# Patient Record
Sex: FEMALE | Race: WHITE | NOT HISPANIC OR LATINO | Employment: FULL TIME | URBAN - METROPOLITAN AREA
[De-identification: names, ages, dates, MRNs, and addresses within clinical notes are randomized per-mention and may not be internally consistent; named-entity substitution may affect disease eponyms.]

---

## 2018-10-17 ENCOUNTER — TRANSCRIBE ORDERS (OUTPATIENT)
Dept: ADMINISTRATIVE | Facility: HOSPITAL | Age: 53
End: 2018-10-17

## 2018-10-17 DIAGNOSIS — I10 HYPERTENSION, UNSPECIFIED TYPE: Primary | ICD-10-CM

## 2018-10-18 ENCOUNTER — LAB (OUTPATIENT)
Dept: LAB | Facility: HOSPITAL | Age: 53
End: 2018-10-18

## 2018-10-18 ENCOUNTER — TRANSCRIBE ORDERS (OUTPATIENT)
Dept: ADMINISTRATIVE | Facility: HOSPITAL | Age: 53
End: 2018-10-18

## 2018-10-18 DIAGNOSIS — R07.9 CHEST PAIN, UNSPECIFIED TYPE: ICD-10-CM

## 2018-10-18 DIAGNOSIS — R53.83 TIREDNESS: ICD-10-CM

## 2018-10-18 DIAGNOSIS — I15.9 SECONDARY HYPERTENSION: ICD-10-CM

## 2018-10-18 DIAGNOSIS — R53.83 TIREDNESS: Primary | ICD-10-CM

## 2018-10-18 LAB
ALBUMIN SERPL-MCNC: 4.9 G/DL (ref 3.5–5.2)
ALBUMIN/GLOB SERPL: 1.9 G/DL
ALP SERPL-CCNC: 64 U/L (ref 39–117)
ALT SERPL W P-5'-P-CCNC: 59 U/L (ref 1–33)
ANION GAP SERPL CALCULATED.3IONS-SCNC: 10.8 MMOL/L
AST SERPL-CCNC: 40 U/L (ref 1–32)
BILIRUB SERPL-MCNC: 0.6 MG/DL (ref 0.1–1.2)
BUN BLD-MCNC: 16 MG/DL (ref 6–20)
BUN/CREAT SERPL: 22.5 (ref 7–25)
CALCIUM SPEC-SCNC: 9.9 MG/DL (ref 8.6–10.5)
CHLORIDE SERPL-SCNC: 104 MMOL/L (ref 98–107)
CHOLEST SERPL-MCNC: 204 MG/DL (ref 0–200)
CO2 SERPL-SCNC: 28.2 MMOL/L (ref 22–29)
CREAT BLD-MCNC: 0.71 MG/DL (ref 0.57–1)
GFR SERPL CREATININE-BSD FRML MDRD: 85 ML/MIN/1.73
GLOBULIN UR ELPH-MCNC: 2.6 GM/DL
GLUCOSE BLD-MCNC: 125 MG/DL (ref 65–99)
HDLC SERPL-MCNC: 44 MG/DL (ref 40–60)
LDLC SERPL CALC-MCNC: 142 MG/DL (ref 0–100)
LDLC/HDLC SERPL: 3.23 {RATIO}
POTASSIUM BLD-SCNC: 4.5 MMOL/L (ref 3.5–5.2)
PROT SERPL-MCNC: 7.5 G/DL (ref 6–8.5)
SODIUM BLD-SCNC: 143 MMOL/L (ref 136–145)
T4 SERPL-MCNC: 6.85 MCG/DL (ref 4.5–11.7)
TRIGL SERPL-MCNC: 89 MG/DL (ref 0–150)
TSH SERPL DL<=0.05 MIU/L-ACNC: 2.88 MIU/ML (ref 0.27–4.2)
VLDLC SERPL-MCNC: 17.8 MG/DL (ref 5–40)

## 2018-10-18 PROCEDURE — 36415 COLL VENOUS BLD VENIPUNCTURE: CPT

## 2018-10-18 PROCEDURE — 80053 COMPREHEN METABOLIC PANEL: CPT

## 2018-10-18 PROCEDURE — 80061 LIPID PANEL: CPT

## 2018-10-18 PROCEDURE — 84443 ASSAY THYROID STIM HORMONE: CPT

## 2018-10-18 PROCEDURE — 84436 ASSAY OF TOTAL THYROXINE: CPT

## 2018-10-22 ENCOUNTER — HOSPITAL ENCOUNTER (OUTPATIENT)
Dept: CARDIOLOGY | Facility: HOSPITAL | Age: 53
Discharge: HOME | End: 2018-10-22
Admitting: INTERNAL MEDICINE

## 2018-10-22 VITALS — DIASTOLIC BLOOD PRESSURE: 85 MMHG | SYSTOLIC BLOOD PRESSURE: 138 MMHG

## 2018-10-22 DIAGNOSIS — I10 HYPERTENSION, UNSPECIFIED TYPE: ICD-10-CM

## 2018-10-22 LAB
BH CV ECHO MEAS - ACS: 2 CM
BH CV ECHO MEAS - AO MEAN PG (FULL): 1 MMHG
BH CV ECHO MEAS - AO MEAN PG: 4 MMHG
BH CV ECHO MEAS - AO ROOT AREA (BSA CORRECTED): 1.5
BH CV ECHO MEAS - AO ROOT AREA: 6.6 CM^2
BH CV ECHO MEAS - AO ROOT DIAM: 2.9 CM
BH CV ECHO MEAS - AO V2 MAX: 130 CM/SEC
BH CV ECHO MEAS - AO V2 MEAN: 88 CM/SEC
BH CV ECHO MEAS - AO V2 VTI: 27.8 CM
BH CV ECHO MEAS - ASC AORTA: 2.9 CM
BH CV ECHO MEAS - AVA(I,A): 3.1 CM^2
BH CV ECHO MEAS - AVA(I,D): 3.1 CM^2
BH CV ECHO MEAS - BSA(HAYCOCK): 2 M^2
BH CV ECHO MEAS - BSA: 1.9 M^2
BH CV ECHO MEAS - BZI_BMI: 30 KILOGRAMS/M^2
BH CV ECHO MEAS - BZI_METRIC_HEIGHT: 165.1 CM
BH CV ECHO MEAS - BZI_METRIC_WEIGHT: 81.6 KG
BH CV ECHO MEAS - EDV(CUBED): 74.1 ML
BH CV ECHO MEAS - EDV(MOD-SP2): 81 ML
BH CV ECHO MEAS - EDV(MOD-SP4): 92 ML
BH CV ECHO MEAS - EDV(TEICH): 78.6 ML
BH CV ECHO MEAS - EF(CUBED): 55.8 %
BH CV ECHO MEAS - EF(MOD-BP): 56 %
BH CV ECHO MEAS - EF(MOD-SP2): 54.3 %
BH CV ECHO MEAS - EF(MOD-SP4): 57.6 %
BH CV ECHO MEAS - EF(TEICH): 47.9 %
BH CV ECHO MEAS - ESV(CUBED): 32.8 ML
BH CV ECHO MEAS - ESV(MOD-SP2): 37 ML
BH CV ECHO MEAS - ESV(MOD-SP4): 39 ML
BH CV ECHO MEAS - ESV(TEICH): 41 ML
BH CV ECHO MEAS - FS: 23.8 %
BH CV ECHO MEAS - IVS/LVPW: 1
BH CV ECHO MEAS - IVSD: 1.1 CM
BH CV ECHO MEAS - LAT PEAK E' VEL: 10 CM/SEC
BH CV ECHO MEAS - LV DIASTOLIC VOL/BSA (35-75): 48.6 ML/M^2
BH CV ECHO MEAS - LV MASS(C)D: 157.1 GRAMS
BH CV ECHO MEAS - LV MASS(C)DI: 83 GRAMS/M^2
BH CV ECHO MEAS - LV MEAN PG: 3 MMHG
BH CV ECHO MEAS - LV SYSTOLIC VOL/BSA (12-30): 20.6 ML/M^2
BH CV ECHO MEAS - LV V1 MAX: 119 CM/SEC
BH CV ECHO MEAS - LV V1 MEAN: 74.3 CM/SEC
BH CV ECHO MEAS - LV V1 VTI: 27.6 CM
BH CV ECHO MEAS - LVIDD: 4.2 CM
BH CV ECHO MEAS - LVIDS: 3.2 CM
BH CV ECHO MEAS - LVLD AP2: 7.3 CM
BH CV ECHO MEAS - LVLD AP4: 7.6 CM
BH CV ECHO MEAS - LVLS AP2: 6.3 CM
BH CV ECHO MEAS - LVLS AP4: 6.7 CM
BH CV ECHO MEAS - LVOT AREA (M): 3.1 CM^2
BH CV ECHO MEAS - LVOT AREA: 3.1 CM^2
BH CV ECHO MEAS - LVOT DIAM: 2 CM
BH CV ECHO MEAS - LVPWD: 1.1 CM
BH CV ECHO MEAS - MED PEAK E' VEL: 9 CM/SEC
BH CV ECHO MEAS - MV A DUR: 0.14 SEC
BH CV ECHO MEAS - MV A MAX VEL: 105 CM/SEC
BH CV ECHO MEAS - MV DEC SLOPE: 314 CM/SEC^2
BH CV ECHO MEAS - MV DEC TIME: 0.22 SEC
BH CV ECHO MEAS - MV E MAX VEL: 95.3 CM/SEC
BH CV ECHO MEAS - MV E/A: 0.91
BH CV ECHO MEAS - MV MEAN PG: 1 MMHG
BH CV ECHO MEAS - MV P1/2T MAX VEL: 81.4 CM/SEC
BH CV ECHO MEAS - MV P1/2T: 75.9 MSEC
BH CV ECHO MEAS - MV V2 MEAN: 52.3 CM/SEC
BH CV ECHO MEAS - MV V2 VTI: 29.8 CM
BH CV ECHO MEAS - MVA P1/2T LCG: 2.7 CM^2
BH CV ECHO MEAS - MVA(P1/2T): 2.9 CM^2
BH CV ECHO MEAS - MVA(VTI): 2.9 CM^2
BH CV ECHO MEAS - PA ACC SLOPE: 0 CM/SEC^2
BH CV ECHO MEAS - PA ACC TIME: 0.16 SEC
BH CV ECHO MEAS - PA MAX PG (FULL): 1 MMHG
BH CV ECHO MEAS - PA MAX PG: 3.1 MMHG
BH CV ECHO MEAS - PA PR(ACCEL): 6.1 MMHG
BH CV ECHO MEAS - PA V2 MAX: 88.4 CM/SEC
BH CV ECHO MEAS - PULM A REVS DUR: 0.11 SEC
BH CV ECHO MEAS - PULM A REVS VEL: 22.1 CM/SEC
BH CV ECHO MEAS - PULM DIAS VEL: 33.8 CM/SEC
BH CV ECHO MEAS - PULM S/D: 1.6
BH CV ECHO MEAS - PULM SYS VEL: 54.1 CM/SEC
BH CV ECHO MEAS - PVA(V,A): 2.8 CM^2
BH CV ECHO MEAS - PVA(V,D): 2.8 CM^2
BH CV ECHO MEAS - QP/QS: 0.74
BH CV ECHO MEAS - RAP SYSTOLE: 3 MMHG
BH CV ECHO MEAS - RV MAX PG: 2.1 MMHG
BH CV ECHO MEAS - RV MEAN PG: 1 MMHG
BH CV ECHO MEAS - RV V1 MAX: 72.2 CM/SEC
BH CV ECHO MEAS - RV V1 MEAN: 47.1 CM/SEC
BH CV ECHO MEAS - RV V1 VTI: 18.6 CM
BH CV ECHO MEAS - RVOT AREA: 3.5 CM^2
BH CV ECHO MEAS - RVOT DIAM: 2.1 CM
BH CV ECHO MEAS - RVSP: 28 MMHG
BH CV ECHO MEAS - SI(AO): 97.1 ML/M^2
BH CV ECHO MEAS - SI(CUBED): 21.8 ML/M^2
BH CV ECHO MEAS - SI(LVOT): 45.8 ML/M^2
BH CV ECHO MEAS - SI(MOD-SP2): 23.3 ML/M^2
BH CV ECHO MEAS - SI(MOD-SP4): 28 ML/M^2
BH CV ECHO MEAS - SI(TEICH): 19.9 ML/M^2
BH CV ECHO MEAS - SUP REN AO DIAM: 1.8 CM
BH CV ECHO MEAS - SV(AO): 183.6 ML
BH CV ECHO MEAS - SV(CUBED): 41.3 ML
BH CV ECHO MEAS - SV(LVOT): 86.7 ML
BH CV ECHO MEAS - SV(MOD-SP2): 44 ML
BH CV ECHO MEAS - SV(MOD-SP4): 53 ML
BH CV ECHO MEAS - SV(RVOT): 64.4 ML
BH CV ECHO MEAS - SV(TEICH): 37.6 ML
BH CV ECHO MEAS - TAPSE (>1.6): 2 CM2
BH CV ECHO MEAS - TR MAX VEL: 251 CM/SEC
BH CV ECHO MEASUREMENTS AVERAGE E/E' RATIO: 10.03
BH CV VAS BP RIGHT ARM: NORMAL MMHG
BH CV XLRA - RV BASE: 2.5 CM
BH CV XLRA - TDI S': 14 CM/SEC
LEFT ATRIUM VOLUME INDEX: 23 ML/M2
LV EF 2D ECHO EST: 56 %
MAXIMAL PREDICTED HEART RATE: 165 BPM
STRESS TARGET HR: 140 BPM

## 2018-10-22 PROCEDURE — 93306 TTE W/DOPPLER COMPLETE: CPT | Performed by: INTERNAL MEDICINE

## 2018-10-22 PROCEDURE — 93306 TTE W/DOPPLER COMPLETE: CPT

## 2018-10-23 ENCOUNTER — HOSPITAL ENCOUNTER (OUTPATIENT)
Dept: GENERAL RADIOLOGY | Facility: HOSPITAL | Age: 53
Discharge: HOME | End: 2018-10-23
Admitting: INTERNAL MEDICINE

## 2018-10-23 DIAGNOSIS — F19.90: ICD-10-CM

## 2018-10-23 PROCEDURE — 71046 X-RAY EXAM CHEST 2 VIEWS: CPT

## 2019-03-26 ENCOUNTER — TELEMEDICINE (OUTPATIENT)
Dept: FAMILY MEDICINE CLINIC | Facility: TELEHEALTH | Age: 54
End: 2019-03-26

## 2019-03-26 DIAGNOSIS — J20.9 ACUTE BRONCHITIS, UNSPECIFIED ORGANISM: Primary | ICD-10-CM

## 2019-03-26 PROCEDURE — VIDEOVISIT: Performed by: NURSE PRACTITIONER

## 2019-03-26 RX ORDER — METHYLPREDNISOLONE 4 MG/1
TABLET ORAL
Qty: 21 TABLET | Refills: 0 | Status: SHIPPED | OUTPATIENT
Start: 2019-03-26 | End: 2020-03-24 | Stop reason: SDUPTHER

## 2019-03-26 RX ORDER — ALBUTEROL SULFATE 90 UG/1
2 AEROSOL, METERED RESPIRATORY (INHALATION) EVERY 4 HOURS PRN
Qty: 1 INHALER | Refills: 0 | Status: SHIPPED | OUTPATIENT
Start: 2019-03-26 | End: 2020-03-24

## 2019-03-26 NOTE — PATIENT INSTRUCTIONS
Comfort Measures--  1. Increase water intake; decaffeinated beverages are best  2. May take mucinex or OTC cold medication if it helps relieve symptoms  3. Tylenol/ibuprofen for fever/pain (make sure if taking cold medication you are not doubling up your dose of either of these)  4. May take sudafed for congestion; DO NOT take if you have high blood pressure.  5. Flonase nasal spray, 1 spray to each side of nose twice a day          Acute Bronchitis, Adult    Acute bronchitis is when air tubes (bronchi) in the lungs suddenly get swollen. The condition can make it hard to breathe. It can also cause these symptoms:  · A cough.  · Coughing up clear, yellow, or green mucus.  · Wheezing.  · Chest congestion.  · Shortness of breath.  · A fever.  · Body aches.  · Chills.  · A sore throat.     Follow these instructions at home:  Medicines  · Take over-the-counter and prescription medicines only as told by your doctor.  · If you were prescribed an antibiotic medicine, take it as told by your doctor. Do not stop taking the antibiotic even if you start to feel better.  General instructions  · Rest.  · Drink enough fluids to keep your pee (urine) clear or pale yellow.  · Avoid smoking and secondhand smoke. If you smoke and you need help quitting, ask your doctor. Quitting will help your lungs heal faster.  · Use an inhaler, cool mist vaporizer, or humidifier as told by your doctor.  · Keep all follow-up visits as told by your doctor. This is important.  How is this prevented?    To lower your risk of getting this condition again:  · Wash your hands often with soap and water. If you cannot use soap and water, use hand .  · Avoid contact with people who have cold symptoms.  · Try not to touch your hands to your mouth, nose, or eyes.  · Make sure to get the flu shot every year.     Contact a doctor if:  · Your symptoms do not get better in 2 weeks.  Get help right away if:  · You cough up blood.  · You have chest  pain.  · You have very bad shortness of breath.  · You become dehydrated.  · You faint (pass out) or keep feeling like you are going to pass out.  · You keep throwing up (vomiting).  · You have a very bad headache.  · Your fever or chills gets worse.      This information is not intended to replace advice given to you by your health care provider. Make sure you discuss any questions you have with your health care provider.    Document Released: 06/05/2009 Document Revised: 07/26/2017 Document Reviewed: 06/07/2017  [employer] Interactive Patient Education © 2018 [employer]     Cough, Adult    A cough helps to clear your throat and lungs. A cough may last only 2-3 weeks (acute), or it may last longer than 8 weeks (chronic). Many different things can cause a cough. A cough may be a sign of an illness or another medical condition.  Follow these instructions at home:  · Pay attention to any changes in your cough.  · Take medicines only as told by your doctor.  ? If you were prescribed an antibiotic medicine, take it as told by your doctor. Do not stop taking it even if you start to feel better.  ? Talk with your doctor before you try using a cough medicine.  · Drink enough fluid to keep your pee (urine) clear or pale yellow.  · If the air is dry, use a cold steam vaporizer or humidifier in your home.  · Stay away from things that make you cough at work or at home.  · If your cough is worse at night, try using extra pillows to raise your head up higher while you sleep.  · Do not smoke, and try not to be around smoke. If you need help quitting, ask your doctor.  · Do not have caffeine.  · Do not drink alcohol.  · Rest as needed.  Contact a doctor if:  · You have new problems (symptoms).  · You cough up yellow fluid (pus).  · Your cough does not get better after 2-3 weeks, or your cough gets worse.  · Medicine does not help your cough and you are not sleeping well.  · You have pain that gets worse or pain that is not  helped with medicine.  · You have a fever.  · You are losing weight and you do not know why.  · You have night sweats.  Get help right away if:  · You cough up blood.  · You have trouble breathing.  · Your heartbeat is very fast.      This information is not intended to replace advice given to you by your health care provider. Make sure you discuss any questions you have with your health care provider.    Document Released: 08/30/2012 Document Revised: 05/25/2017 Document Reviewed: 02/24/2016  [employer] Interactive Patient Education © 2018 [employer]

## 2019-03-26 NOTE — PROGRESS NOTES
CHIEF COMPLAINT  No chief complaint on file.      HPI  Yokasta Benitez is a 53 y.o. female  presents with complaint of 4-5 day history of chest congestion, sore throat, prod cough with yellowish sputum production, fever with chills/sweaty, mild wheezing, ear pain, nasal congestion with runny nose which has improved, decreased appetite, fatigue    She has history of bronchitis which tends to come on quickly; denies hx of pneumonia    Review of Systems   Denies h/a, congestion, sinus pressure/pain, sore throat, cough,  SOA, body aches,  n/v/d, abdominal pain    Pertinent ROS noted in HPI    Past Medical History:   Diagnosis Date   • Hypertension 10/2018       Family History   Problem Relation Age of Onset   • Cancer Father          within 6 weeks of diagnosis       Social History     Socioeconomic History   • Marital status:      Spouse name: Not on file   • Number of children: Not on file   • Years of education: Not on file   • Highest education level: Not on file   Tobacco Use   • Smoking status: Former Smoker     Packs/day: 1.00     Years: 15.00     Pack years: 15.00     Start date: 1985     Last attempt to quit: 2002     Years since quittin.1   Substance and Sexual Activity   • Alcohol use: No   • Drug use: No   • Sexual activity: No         There were no vitals taken for this visit.    PHYSICAL EXAM  Physical Exam   Constitutional: She is oriented to person, place, and time. She appears well-developed and well-nourished. She is cooperative.   HENT:   Head: Normocephalic and atraumatic.   Neurological: She is alert and oriented to person, place, and time.         Problem List Items Addressed This Visit     None      Visit Diagnoses     Acute bronchitis, unspecified organism    -  Primary    Relevant Medications    albuterol sulfate  (90 Base) MCG/ACT inhaler    methylPREDNISolone (MEDROL, TERE,) 4 MG tablet        Discussed home care instructions which will be available in patient's My  Chart account.    Bronchitis is a viral illness, therefore an antibiotic would not help her symptoms, she has been instructed to take a tapering dose of Medrol to help decrease inflammation in her lungs; also she will start an Albuterol inhaler every 4-6 hours as needed for wheezing; increase fluid intake to maintain hydration, rest as needed; comfort measures are included in home care instructions      FOLLOW-UP  2-3 days with Dr. Paulino Garcia if no improvement or worsening of symptoms    Patient verbalizes understanding of medication dosage, comfort measures, instructions for treatment and follow-up.    Digna Rascon, ECTOR  03/26/2019  12:38 PM    Virtual visit via My Chart video conference:  Due to the inability to perform a vital signs or a physical exam, treatment is guided by information provided by the patient during the visit; all patients are instructed to follow-up with their primary care provider if their symptoms worsen or the treatment provided does not resolve their illness; if the illness and/or symptoms warrant the need for an in-person visit for further evaluation, the patient will be notified of the provider's concern.

## 2019-03-28 ENCOUNTER — TELEPHONE (OUTPATIENT)
Dept: FAMILY MEDICINE CLINIC | Facility: TELEHEALTH | Age: 54
End: 2019-03-28

## 2019-10-16 ENCOUNTER — TRANSCRIBE ORDERS (OUTPATIENT)
Dept: ADMINISTRATIVE | Facility: HOSPITAL | Age: 54
End: 2019-10-16

## 2019-10-16 DIAGNOSIS — Z12.39 SCREENING BREAST EXAMINATION: Primary | ICD-10-CM

## 2019-11-11 ENCOUNTER — HOSPITAL ENCOUNTER (OUTPATIENT)
Dept: MAMMOGRAPHY | Facility: HOSPITAL | Age: 54
Discharge: HOME | End: 2019-11-11
Admitting: FAMILY MEDICINE

## 2019-11-11 DIAGNOSIS — Z12.39 SCREENING BREAST EXAMINATION: ICD-10-CM

## 2019-11-11 PROCEDURE — 77063 BREAST TOMOSYNTHESIS BI: CPT

## 2019-11-11 PROCEDURE — 77067 SCR MAMMO BI INCL CAD: CPT

## 2019-11-12 ENCOUNTER — APPOINTMENT (OUTPATIENT)
Dept: MAMMOGRAPHY | Facility: HOSPITAL | Age: 54
End: 2019-11-12

## 2020-02-04 ENCOUNTER — PREP FOR SURGERY (OUTPATIENT)
Dept: OTHER | Facility: HOSPITAL | Age: 55
End: 2020-02-04

## 2020-02-04 DIAGNOSIS — Z12.11 ENCOUNTER FOR SCREENING COLONOSCOPY: Primary | ICD-10-CM

## 2020-02-10 PROBLEM — Z12.11 ENCOUNTER FOR SCREENING COLONOSCOPY: Status: ACTIVE | Noted: 2020-02-10

## 2020-03-24 ENCOUNTER — E-VISIT (OUTPATIENT)
Dept: FAMILY MEDICINE CLINIC | Facility: TELEHEALTH | Age: 55
End: 2020-03-24

## 2020-03-24 DIAGNOSIS — J40 BRONCHITIS: Primary | ICD-10-CM

## 2020-03-24 PROBLEM — I10 HYPERTENSION: Status: ACTIVE | Noted: 2018-10-08

## 2020-03-24 PROCEDURE — 99421 OL DIG E/M SVC 5-10 MIN: CPT | Performed by: NURSE PRACTITIONER

## 2020-03-24 RX ORDER — CEFDINIR 300 MG/1
300 CAPSULE ORAL 2 TIMES DAILY
Qty: 14 CAPSULE | Refills: 0 | Status: SHIPPED | OUTPATIENT
Start: 2020-03-24 | End: 2022-01-03

## 2020-03-24 RX ORDER — METHYLPREDNISOLONE 4 MG/1
TABLET ORAL
Qty: 21 TABLET | Refills: 0 | Status: SHIPPED | OUTPATIENT
Start: 2020-03-24 | End: 2022-01-03

## 2020-03-24 RX ORDER — BENZONATATE 200 MG/1
200 CAPSULE ORAL 3 TIMES DAILY PRN
Qty: 30 CAPSULE | Refills: 0 | Status: SHIPPED | OUTPATIENT
Start: 2020-03-24 | End: 2022-01-03

## 2020-03-24 RX ORDER — ALBUTEROL SULFATE 90 UG/1
1 AEROSOL, METERED RESPIRATORY (INHALATION) EVERY 6 HOURS PRN
Qty: 1 INHALER | Refills: 0 | Status: SHIPPED | OUTPATIENT
Start: 2020-03-24 | End: 2022-01-03

## 2020-03-24 NOTE — PROGRESS NOTES
COVID-19 screening--negative    Yokasta Benitez    1965  [id number]    I have reviewed the e-Visit questionnaire and patient's answers and have spoken with the patient on the phone, my assessment and plan are as follows:    HPI  Patient reports runny nose, nasal congestion, post nasal drainage, headache, left ear pain and dry cough.  She reports the head symptoms started about 5 days ago and the cough started yesterday.  No fever.  She reports her chest hurts with hard coughing and at times she feels like she has had some wheezing.  She reports similar symptoms with bronchitis in the past.      Review of Systems - Negative except as noted in history      Diagnoses and all orders for this visit:    Bronchitis    Other orders  -     cefdinir (OMNICEF) 300 MG capsule; Take 1 capsule by mouth 2 (Two) Times a Day.  -     albuterol sulfate  (90 Base) MCG/ACT inhaler; Inhale 1 puff Every 6 (Six) Hours As Needed for Wheezing or Shortness of Air.  -     methylPREDNISolone (MEDROL, TERE,) 4 MG tablet; Take as directed on package instructions.  -     benzonatate (TESSALON) 200 MG capsule; Take 1 capsule by mouth 3 (Three) Times a Day As Needed for Cough.    Medication as prescribed.  Coricidin HBP over the counter for symptom relief.  May also use saline nasal spray for congestion.  Follow up in 1 week if no improvement.      Any medications prescribed have been sent electronically to   [facility] [address] [place], [place] [address] [address] [address] [facility] [address] - [phone]  - [phone] [address]  [facility] [address]  Phone: [phone] Fax: [phone]        ECTOR Evans  03/24/20  7:44 AM

## 2020-03-24 NOTE — PATIENT INSTRUCTIONS
Medication as prescribed.  Coricidin HBP over the counter for symptom relief.  May also use saline nasal spray for congestion.  Follow up in 1 week if no improvement.

## 2020-03-30 ENCOUNTER — OFFICE VISIT CONVERTED (OUTPATIENT)
Dept: OTHER | Facility: HOSPITAL | Age: 55
End: 2020-03-30
Attending: PHYSICIAN ASSISTANT

## 2020-03-30 ENCOUNTER — E-VISIT (OUTPATIENT)
Dept: FAMILY MEDICINE CLINIC | Facility: TELEHEALTH | Age: 55
End: 2020-03-30

## 2020-03-30 ENCOUNTER — HOSPITAL ENCOUNTER (OUTPATIENT)
Dept: OTHER | Facility: HOSPITAL | Age: 55
Discharge: HOME | End: 2020-03-30
Attending: PHYSICIAN ASSISTANT

## 2020-03-30 NOTE — PROGRESS NOTES
I counseled the patient to follow up with UC.    Per algorithm, recommended she seek evaluation at designated testing site.

## 2020-04-02 ENCOUNTER — OFFICE VISIT CONVERTED (OUTPATIENT)
Dept: OTHER | Facility: HOSPITAL | Age: 55
End: 2020-04-02
Attending: PHYSICIAN ASSISTANT

## 2020-04-03 ENCOUNTER — CONVERSION ENCOUNTER (OUTPATIENT)
Dept: OTHER | Facility: HOSPITAL | Age: 55
End: 2020-04-03

## 2020-04-03 ENCOUNTER — TELEMEDICINE CONVERTED (OUTPATIENT)
Dept: OTHER | Facility: HOSPITAL | Age: 55
End: 2020-04-03
Attending: PHYSICIAN ASSISTANT

## 2020-06-02 ENCOUNTER — TELEPHONE (OUTPATIENT)
Dept: SURGERY | Facility: CLINIC | Age: 55
End: 2020-06-02

## 2020-06-02 NOTE — TELEPHONE ENCOUNTER
We contacted pt on several occasion to re-schedule c-cope w/no return call we will take pt off list & she can call to sched @ her convenience

## 2020-08-05 ENCOUNTER — HOSPITAL ENCOUNTER (OUTPATIENT)
Dept: GENERAL RADIOLOGY | Facility: HOSPITAL | Age: 55
Discharge: HOME | End: 2020-08-05
Admitting: FAMILY MEDICINE

## 2020-08-05 DIAGNOSIS — J18.9 PNEUMONIA DUE TO INFECTIOUS ORGANISM, UNSPECIFIED LATERALITY, UNSPECIFIED PART OF LUNG: ICD-10-CM

## 2020-08-05 PROCEDURE — 71046 X-RAY EXAM CHEST 2 VIEWS: CPT

## 2021-05-10 NOTE — H&P
History and Physical      Patient Name: Yokasta Benitez   Patient ID: [id number]   Sex: Female   YOB: 1965    Referring Provider: Meche BARNEY    Visit Date: March 30, 2020    Provider: Anusha Padilla PA-C   Location[facility]   Location Address: [address] [place] [address]  [id number]   Location Phone: [phone]          Chief Complaint  · Evaluation for Respiratory Virus      History Of Present Illness  Yokasta Benietz is a 54 year old /White female who presents today to the clinic for evaluation of a respiratory virus.   Date of Onset: 03/23/2020   What Symptoms: fatigue , fever, nausea, and shortness of breath   Quality of Symptoms: Patient was treated by [facility] last week and treated for bronchitis. She had albuterol inhaler, cough drops, prednisone steroid pack, cefdinir. She is not getting better. She feels fatigued. She is short of breath with minimal activity. She has ongoing nausea. She has had low grade fevers daily. Never above 100.4 But between 99 and 100   Anything make it worse or better: Nothin has helped. Even the medications above.   Severity of Symptoms: severe   Any known Exposure to COVID-19: works in [employer] at [employer].       Allergy List  azithromycin       Allergies Reconciled  Review of Systems  · Constitutional  o Admits  o : fatigue, fever  o Denies  o : weight gain, weight loss  · Respiratory  o Denies  o : cough, asthma  · Gastrointestinal  o Admits  o : nausea  o Denies  o : vomiting, diarrhea, constipation, abdominal pain  · Integument  o Denies  o : rash  · Neurologic  o Denies  o : headache      Vitals  Date Time BP Position Site L\R Cuff Size HR RR TEMP (F) WT  HT  BMI kg/m2 BSA m2 O2 Sat HC       03/30/2020 10:10 AM      108 - R  99.6     96 %          Physical Examination  · Constitutional  o Appearance  o : no acute distress, well-nourished  · Head and Face  o Head  o :   § Inspection  § : atraumatic,  normocephalic  · Eyes  o Eyes  o : extraocular movements intact, no scleral icterus, no conjunctival injection  · Ears, Nose, Mouth and Throat  o Ears  o :   § External Ears  § : normal  § Otoscopic Examination  § : normal tympanic membrane exam  o Nose  o :   § Intranasal Exam  § : sinuses nontender to palpation  o Oral Cavity  o :   § Oral Mucosa  § : moist mucous membranes  o Throat  o :   § Oropharynx  § : no inflammation or lesions present, tonsillar exudates present   · Respiratory  o Respiratory Effort  o : breathing comfortably, symmetric chest rise  o Auscultation of Lungs  o : wheezing present-left-lower lobe   · Cardiovascular  o Heart  o :   § Auscultation of Heart  § : regular rate and rhythm, no murmurs, rubs, or gallops  o Peripheral Vascular System  o :   § Extremities  § : no edema  · Lymphatic  o Neck  o : no lymphadenopathy present  o Supraclavicular Nodes  o : no supraclavicular nodes  · Skin and Subcutaneous Tissue  o General Inspection  o : normal inspection  · Neurologic  o Mental Status Examination  o :   § Orientation  § : grossly oriented to person, place and time  o Gait and Station  o :   § Gait Screening  § : normal gait  · Psychiatric  o General  o : normal mood and affect     Eyes are very glassy in appearance.  Continue to water      She coughs with deep inspiration.               Results  · In-Office Procedures  o Lab procedure  § IOP - Influenza A/B Test (32372)   § Influenza A: Negative   § Influenza B: Negative   § Internal Control Verified?: Yes   § Rapid group A Streptococcus screen (45005)   § Beta Strep Gp A Culture: Negative   § Internal Control Verified?: Yes       Assessment  · Shortness of breath     786.05/R06.02  · Tonsillar exudate     474.8/J35.8    Problems Reconciled  Plan  · Orders  o Chest (AP PA and Lateral) 2 views X-Ray [facility] (27712) - 786.05/R06.02 - 03/30/2020  · Medications  o Augmentin 875-125 mg oral tablet   SIG: take 1 tablet by oral route every 12 hours  for 10 days   DISP: (20) tablets with 0 refills  Prescribed on 03/30/2020     o Diflucan 150 mg oral tablet   SIG: take 1 tablet (150 mg) by oral route once   DISP: (1) tablet with 0 refills  Prescribed on 03/30/2020     o Medications have been Reconciled  o Transition of Care or Provider Policy  · Instructions  o Plan of Care:   o Patient instructed to seek medical attention urgently for new or worsening symptoms.  o Patient was educated on their diagnosis, treatment, and medications today.   o Recommend self monitoring. Instructions given.   o Self-monitoring for 14 days. Instructions given.  o Stay home until without fever for 72 hours without using fever-reducing medications and other symptoms are gone.  o Recommends over the counter medications for symptom management.  o Follow-up with PCP in 2-3 days.  o Patient was given the no test Covid paperwork. She is not to return to work until 3 days without fever without treatment. Augmentin ordered due to tonsillar exudate.   · Disposition  o Call or Return if symptoms worsen or persist.            Electronically Signed by: Anusha Padilla PA-C -Author on March 30, 2020 10:40:23 AM

## 2021-05-12 NOTE — PROGRESS NOTES
Quick Note      Patient Name: Yokasta Benitez   Patient ID: [id number]   Sex: Female   YOB: 1965    Primary Care Provider: Paulino Garcia MD   Referring Provider: Meche BARNEY    Visit Date: April 3, 2020    Provider: Anusha Padilla PA-C   Location: [facility]   Location Address: [address] [place] [address]  [id number]   Location Phone: [phone]          History Of Present Illness  TELEHEALTH VISIT  Chief Complaint: Follow-up after telehealth visit on 4/2/2020 where she was complaining of worsening in her persistent nausea and vomiting. Zofran was called in as well as Phenergan suppositories   Yokasta Benitez is a 54 year old /White female who is presenting for evaluation via telehealth visit. Verbal consent obtained before beginning visit.   The following staff were present during this visit: Front office staff of [facility]   Past Medical History/Overview of Patient Symptoms  Date of Onset: 3/23/2020   What Symptoms: Patient called the office yesterday and rescheduled telehealth visit. As a telehealth visit patient stated that she was with persistent nausea and vomiting Zofran was not working. She had been to the emergency room on 3/31/2020 after being seen in the office. They sent her home after giving her IV fluids. She states that her shortness of breath has not worsened and it has not necessarily improved. Since being evaluated yesterday via telehealth patient was given Phenergan suppositories. She states last night was the first night she was able to get some sleep without feeling like she was constantly nauseous and worried about vomiting. She did sound slightly better today as far as her hoarseness on exam. She is still coughing, she still feels fatigued. She is working on increasing her fluid intake now that her nausea started to improve with the Phenergan. She still has a horrific headache. She states OTC medications are not helping. She  is still able to take oral antibiotic.   Quality of Symptoms: severe   Anything make it worse or better: Phenergan has helped with nausea and vomiting   Severity of Symptoms: severe as far as nausea   Any known Exposure to Person with COVID-19: no known exposure   Provider spent 9 minutes minutes with the patient during telehealth visit.       Vitals  Date Time BP Position Site L\R Cuff Size HR RR TEMP (F) WT  HT  BMI kg/m2 BSA m2 O2 Sat HC       04/03/2020 10:29 AM        100.4               Physical Examination  · Constitutional  o Appearance  o : well-nourished, well developed, alert, in no acute distress  · Neurologic  o Mental Status Examination  o :   § Orientation  § : grossly oriented to person, place and time  § Attention  § : attention normal, concentration abilities normal  § Fund of Knowledge  § : fund of knowledge within normal limits, patient aware of current events  · Psychiatric  o Judgement and Insight  o : judgment and insight intact, judgement for everyday activities and social situations within normal limits, insight intact  o Mood and Affect  o : mood normal, affect appropriate          Assessment  · Cough     786.2/R05  · Nausea     787.02/R11.0  · Vomiting     787.03/R11.10      Plan  · Orders  o Physican Telephone evaluation, 5-10 min (73740) - 787.02/R11.0, 787.03/R11.10, 786.2/R05 - 04/03/2020  · Medications  o Medications have been Reconciled  o Transition of Care or Provider Policy  · Instructions  o Plan Of Care:   o Patient instructed to seek medical attention urgently for new or worsening symptoms.  o Patient was educated/instructed on their diagnosis, treatment and medications today.  o Recommend self monitoring. Instructions given.  o Recommends over the counter medications for symptom management.   o Follow-up with PCP in 2-3 days.  o she Will return for annual clinic evaluation on Monday of next week. We will reevaluate as far as physical exam and vital signs. If she is still with  worsening in her nausea and vomiting we can potentially look at IV fluid hydration. If she gets worse over the weekend she is to go to the ER for further evaluation. She can continue taking Zofran and Phenergan as needed. She needs to complete antibiotic course as well  o Electronically Identified Patient Education Materials Provided Electronically            Electronically Signed by: Anusha Padilla PA-C -Author on April 3, 2020 10:29:58 AM

## 2021-05-12 NOTE — PROGRESS NOTES
Quick Note      Patient Name: Yokasta Benitez   Patient ID: [id number]   Sex: Female   YOB: 1965    Primary Care Provider: Paulino Garcia MD   Referring Provider: Meche BARNEY    Visit Date: April 2, 2020    Provider: Anusha Padilla PA-C   Location: [facility]   Location Address: [address] [place] [address]  [id number]   Location Phone: [phone]          History Of Present Illness  Yokasta Benitez is a 54 year old /White female who was contacted today telehealth follow-up visit after evaluation and the respiratory viral evaluation center   Date of Onset: 03/23/2020   What Symptoms: fatigue , fever, nausea, and shortness of breath   Quality of Symptoms: Patient was originally treated by [facility] last week and treated for bronchitis. She had albuterol inhaler, cough drops, prednisone steroid pack, cefdinir. She was not getting better. I did an xray which showed concern for pneumonia. She was sent home with Zofran and Augmentin. After visit she was not improving as far as her nausea and she went to the ER on 3/31/20. Patient was given IV fluids 1 liter of normal saline. She was given Zofran. Repeat x ray was done showing worsening pneumonia. Her O2 saturation was 99% on room air. Patient was discharged home from the ER. She was contacted by our office today for follow up. She states she is not any better. Zofran is not helping. She has not been able to eat and has lost 20 pounds. She is persistently nauseated and has been vomiting multiple times a day. She tries to sip Sprite or water but sometiems it comes right back up. She complains of headache. She does not know if this is because of dehydration or vomiting. Course is just another symptom. Sometimes the medicine that she takes stays down. Sometimes they come right back. Is still shortness of breath but it has not gotten any worse.   Anything make it worse or better: Zofran has not helped with  nausea. She does not feel any better on antibiotic   Severity of Symptoms: severe   Any known Exposure to COVID-19: works in [employer] at [employer].       Vitals     telehealth visit via phone.  No vitals obtained       Physical Examination  · Neurologic  o Mental Status Examination  o :   § Orientation  § : grossly oriented to person, place and time  · Psychiatric  o General  o : normal mood and affect          Assessment  · Headache     784.0/R51  · Nausea     787.02/R11.0  · Shortness of breath     786.05/R06.02  · Vomiting     787.03/R11.10  · Tonsillar exudate     474.8/J35.8  · Pneumonia     486/J18.9      Plan  · Medications  o Medications have been Reconciled  o Transition of Care or Provider Policy  · Instructions  o Plan of Care: Phenergan was called in to pharmacy by VICENTA in office. Patient will try and see if that helps her nausea. If it does not, she is to return to ER for further evaluation and IV fluid hydration. Patient will be contacted by our office again tomorrow for further evaluation. This will be by telehealth. Time on phone with patient today was 5-10 minutes. Patient's PCP was contacted but is on spring break this week and no one is in office.  · Disposition  o Call or Return if symptoms worsen or persist.            Electronically Signed by: Anusha Padilla PA-C -Author on April 2, 2020 09:02:00 AM

## 2021-05-15 VITALS — OXYGEN SATURATION: 96 % | HEART RATE: 108 BPM | TEMPERATURE: 99.6 F

## 2021-05-15 VITALS — TEMPERATURE: 100.4 F

## 2021-08-05 ENCOUNTER — E-VISIT (OUTPATIENT)
Dept: FAMILY MEDICINE CLINIC | Facility: TELEHEALTH | Age: 56
End: 2021-08-05

## 2022-01-02 PROBLEM — Z12.11 ENCOUNTER FOR SCREENING COLONOSCOPY: Status: RESOLVED | Noted: 2020-02-10 | Resolved: 2022-01-02

## 2022-01-02 PROBLEM — Z00.00 WELL ADULT EXAM: Status: ACTIVE | Noted: 2022-01-02

## 2022-01-02 PROBLEM — I10 PRIMARY HYPERTENSION: Status: RESOLVED | Noted: 2018-10-08 | Resolved: 2022-01-02

## 2022-01-02 NOTE — PROGRESS NOTES
"Chief Complaint  Annual Exam (New patient appointment.)    Subjective      Yokasta Benitez presents to [facility]    History of Present Illness  Patient 56-year-old female with prior treatment for hypertension off meds for the past year, Covid pneumonia in 3/2020 not requiring hospitalization, previously followed by Dr. Garcia, coming in 1/22 as a new patient appointment.    Review of Systems   Constitutional: Negative for appetite change, fatigue and fever.   HENT: Negative for congestion and ear pain.    Eyes: Negative for blurred vision.   Respiratory: Negative for cough, chest tightness, shortness of breath and wheezing.    Cardiovascular: Negative for chest pain, palpitations and leg swelling.   Gastrointestinal: Negative for abdominal pain.   Genitourinary: Negative for difficulty urinating, dysuria and hematuria.   Musculoskeletal: Negative for arthralgias and gait problem.   Skin: Negative for skin lesions.   Neurological: Negative for syncope, memory problem and confusion.   Psychiatric/Behavioral: Negative for self-injury and depressed mood.       Objective   Vital Signs:   BP (!) 184/93   Pulse 90   Temp 98.2 °F (36.8 °C)   Ht 165.1 cm (65\")   Wt 81.1 kg (178 lb 12.8 oz)   SpO2 97%   BMI 29.75 kg/m²           Physical Exam  Vitals and nursing note reviewed.   Constitutional:       General: She is not in acute distress.     Appearance: Normal appearance. She is not toxic-appearing.   HENT:      Head: Atraumatic.      Right Ear: External ear normal.      Left Ear: External ear normal.      Nose: Nose normal.      Mouth/Throat:      Mouth: Mucous membranes are moist.   Eyes:      General:         Right eye: No discharge.         Left eye: No discharge.      Extraocular Movements: Extraocular movements intact.      Pupils: Pupils are equal, round, and reactive to light.   Neck:      Comments: No bruit.  Cardiovascular:      Rate and Rhythm: Normal rate and " regular rhythm.      Pulses: Normal pulses.      Heart sounds: Normal heart sounds. No murmur heard.  No gallop.       Comments: Heart tones normal, no ectopy, no S3.  Pulmonary:      Effort: Pulmonary effort is normal. No respiratory distress.      Breath sounds: No wheezing, rhonchi or rales.      Comments: Lung fields clear bilaterally.  Abdominal:      General: There is no distension.      Palpations: Abdomen is soft. There is no mass.      Tenderness: There is no abdominal tenderness. There is no guarding.   Musculoskeletal:         General: No swelling or tenderness.      Cervical back: No tenderness.      Right lower leg: No edema.      Left lower leg: No edema.      Comments: No edema, distal pulses intact.   Skin:     General: Skin is warm and dry.      Findings: No rash.   Neurological:      General: No focal deficit present.      Mental Status: She is alert and oriented to person, place, and time. Mental status is at baseline.      Motor: No weakness.      Gait: Gait normal.   Psychiatric:         Mood and Affect: Mood normal.         Thought Content: Thought content normal.          Result Review   The following data was reviewed by: Tree Seymour MD on 01/03/2022:  [x] Laboratory  [] Microbiology  [] Radiology  [] EKG/telemetry  [] Cardiology/Vascular  [] Pathology  [x] Old records             Assessment and Plan   Diagnoses and all orders for this visit:    1. Well adult exam (Primary)  Overview:  Preventive measures: were reviewed with the patient at this office visit.  They included but were not limited to discussions in regards to vaccines outstanding, auto safety with seat belts and other assistive devices, fall prevention, and routine screening studies.    Exercise: Patient goes to the gym several times a week on average.  Comprehensive labs: All ordered before return to office in 3 months.    Covid vaccine: J&J vaccine x1 in 8/21, discussed with patient she should seriously consider getting the 3  dose treatment of either Pfizer or Moderna as of  office visit.  Flu shot: Completed for 2021 season.    MM19 appears to be last---> discussed with patient this is overdue as of  office visit, she said that she would consider it later in the year.  COLON: Patient is aware as of  that she is 8 years overdue on the colonoscopy.  She denies any blood in her stool other symptoms, discussed with screening studies are recommended regardless.    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 Rodriguez.    Orders:  -     CBC & Differential; Future  -     Comprehensive Metabolic Panel; Future  -     TSH+Free T4; Future  -     Vitamin D 1,25 Dihydroxy; Future  -     Urinalysis With Culture If Indicated -; Future    2. Mixed hyperlipidemia  Assessment & Plan:  LDL was elevated at 142 in , discussed with patient this needs to be repeated and likely treated as of .    Orders:  -     Lipid Panel; Future  -     CK; Future    3. Hyperglycemia  Assessment & Plan:  There was at least one mildly elevated blood sugar, will get A1c with her labs here shortly.    Orders:  -     Hemoglobin A1c; Future    4. Primary hypertension  Overview:  Patient had echocardiogram 10/18 with no significant findings.  EF was normal, there were no valvular abnormalities.    Assessment & Plan:  Patient has previously been on losartan 50 and metoprolol 100.  Patient reports she lost over 30 pounds and weaned herself off of these medications in 2021.  Blood pressure was documented at 164/94 in urgent care and obviously is elevated as of her  office visit.  I confirmed the results.  Discussed with patient she needs to start monitoring her blood pressure several times a week and to bring those results to her follow-up office visit along with her home blood pressure monitor.          Follow Up   Return in about 3 months (around 4/3/2022).  Patient was given  instructions and counseling regarding her condition or for health maintenance advice. Please see specific information pulled into the AVS if appropriate.

## 2022-01-03 ENCOUNTER — OFFICE VISIT (OUTPATIENT)
Dept: INTERNAL MEDICINE | Facility: CLINIC | Age: 57
End: 2022-01-03

## 2022-01-03 VITALS
WEIGHT: 178.8 LBS | BODY MASS INDEX: 29.79 KG/M2 | HEART RATE: 90 BPM | OXYGEN SATURATION: 97 % | HEIGHT: 65 IN | TEMPERATURE: 98.2 F | SYSTOLIC BLOOD PRESSURE: 184 MMHG | DIASTOLIC BLOOD PRESSURE: 93 MMHG

## 2022-01-03 DIAGNOSIS — Z00.00 WELL ADULT EXAM: Primary | ICD-10-CM

## 2022-01-03 DIAGNOSIS — I10 PRIMARY HYPERTENSION: ICD-10-CM

## 2022-01-03 DIAGNOSIS — R73.9 HYPERGLYCEMIA: ICD-10-CM

## 2022-01-03 DIAGNOSIS — E78.2 MIXED HYPERLIPIDEMIA: ICD-10-CM

## 2022-01-03 PROCEDURE — 99386 PREV VISIT NEW AGE 40-64: CPT | Performed by: INTERNAL MEDICINE

## 2022-01-03 NOTE — ASSESSMENT & PLAN NOTE
LDL was elevated at 142 in 2018, discussed with patient this needs to be repeated and likely treated as of 1/22.

## 2022-01-03 NOTE — ASSESSMENT & PLAN NOTE
Patient has previously been on losartan 50 and metoprolol 100.  Patient reports she lost over 30 pounds and weaned herself off of these medications in January 2021.  Blood pressure was documented at 164/94 in urgent care and obviously is elevated as of her 1/22 office visit.  I confirmed the results.  Discussed with patient she needs to start monitoring her blood pressure several times a week and to bring those results to her follow-up office visit along with her home blood pressure monitor.

## 2022-01-05 ENCOUNTER — PATIENT ROUNDING (BHMG ONLY) (OUTPATIENT)
Dept: INTERNAL MEDICINE | Facility: CLINIC | Age: 57
End: 2022-01-05

## 2022-01-05 NOTE — PROGRESS NOTES
January 5, 2022    Hello, may I speak with Yokasta Benitez?    My name is Jeremiah Louise     I am  with [facility]  [facility]  [address] [id number].    Before we get started may I verify your date of birth? 1965    I am calling to officially welcome you to our practice and ask about your recent visit. Is this a good time to talk? yes    Tell me about your visit with us. What things went well?  everything went really well, it was a good visit and there were no issues.       We're always looking for ways to make our patients' experiences even better. Do you have recommendations on ways we may improve? My  was seen and he had to leave without being seen due to his work and he waited over an hour for his visit    Overall were you satisfied with your first visit to our practice? yes       I appreciate you taking the time to speak with me today. Is there anything else I can do for you? no      Thank you, and have a great day.

## 2022-11-14 ENCOUNTER — TELEMEDICINE (OUTPATIENT)
Dept: FAMILY MEDICINE CLINIC | Facility: TELEHEALTH | Age: 57
End: 2022-11-14

## 2022-11-14 DIAGNOSIS — Z20.822 ENCOUNTER BY TELEHEALTH FOR SUSPECTED COVID-19: Primary | ICD-10-CM

## 2022-11-14 DIAGNOSIS — J06.9 UPPER RESPIRATORY TRACT INFECTION, UNSPECIFIED TYPE: ICD-10-CM

## 2022-11-14 PROCEDURE — BHEMPVIDEOVISIT: Performed by: NURSE PRACTITIONER

## 2022-11-14 PROCEDURE — U0004 COV-19 TEST NON-CDC HGH THRU: HCPCS | Performed by: NURSE PRACTITIONER

## 2022-11-14 NOTE — PATIENT INSTRUCTIONS
10 Things You Can Do to Manage Your COVID-19 Symptoms at Home  If you have possible or confirmed COVID-19  Stay home except to get medical care.  Monitor your symptoms carefully. If your symptoms get worse, call your healthcare provider immediately.  Get rest and stay hydrated.  If you have a medical appointment, call the healthcare provider ahead of time and tell them that you have or may have COVID-19.  For medical emergencies, call 911 and notify the dispatch personnel that you have or may have COVID-19.  Cover your cough and sneezes with a tissue or use the inside of your elbow.  Wash your hands often with soap and water for at least 20 seconds or clean your hands with an alcohol-based hand  that contains at least 60% alcohol.  As much as possible, stay in a specific room and away from other people in your home. Also, you should use a separate bathroom, if available. If you need to be around other people in or outside of the home, wear a mask.  Avoid sharing personal items with other people in your household, like dishes, towels, and bedding.  Clean all surfaces that are touched often, like counters, tabletops, and doorknobs. Use household cleaning sprays or wipes according to the label instructions.  cdc.gov/coronavirus  07/16/2021  This information is not intended to replace advice given to you by your health care provider. Make sure you discuss any questions you have with your health care provider.  Document Revised: 09/09/2022 Document Reviewed: 09/09/2022  [employer] Patient Education © 2022 [employer]

## 2022-11-14 NOTE — PROGRESS NOTES
CHIEF COMPLAINT  Chief Complaint   Patient presents with   • URI   • Cough   • Sinus Problem   • Nasal Congestion   • Fever     Low grade intermittent         HPI  Yokasta Benitez is a 57 y.o. female  presents with complaint of 4 day h/o sinus pressure, nasal congestion, cough, chest congestion, low grade fever and mild nausea. She  is self medicating with Dayquil. She states the cough is causing soreness and sleep disruption. She reports no wheezing or shortness of breath. She is a [employer] employee and needs a PCR test.     Review of Systems   Constitutional: Positive for activity change, appetite change and fever. Negative for chills and fatigue (low grade).   HENT: Positive for congestion, postnasal drip, rhinorrhea, sinus pressure, sinus pain and sore throat.    Respiratory: Positive for cough. Negative for shortness of breath, wheezing and stridor.    Cardiovascular: Negative.    Gastrointestinal: Negative.    Musculoskeletal: Negative.    Neurological: Positive for headaches.   Hematological: Negative.    Psychiatric/Behavioral: Negative.        Past Medical History:   Diagnosis Date   • Hypertension 10/2018       Family History   Problem Relation Age of Onset   • Cancer Father          within 6 weeks of diagnosis       Social History     Socioeconomic History   • Marital status:    Tobacco Use   • Smoking status: Former     Packs/day: 1.00     Years: 15.00     Pack years: 15.00     Types: Cigarettes     Start date: 1985     Quit date: 2002     Years since quittin.7   • Smokeless tobacco: Never   Vaping Use   • Vaping Use: Never used   Substance and Sexual Activity   • Alcohol use: No   • Drug use: No   • Sexual activity: Never         There were no vitals taken for this visit.    PHYSICAL EXAM  Physical Exam   Constitutional: She is oriented to person, place, and time. She appears well-developed and well-nourished. She does not have a sickly appearance. She does not appear ill. No  distress.   HENT:   Head: Normocephalic and atraumatic.   Mouth/Throat: Mouth/Lips are normal.  Pulmonary/Chest: Effort normal.  No respiratory distress. She no audible wheeze...  Neurological: She is alert and oriented to person, place, and time.   Psychiatric: She has a normal mood and affect.   Vitals reviewed.      Results for orders placed or performed in visit on 10/27/21   Mumps Antibody, IgG    Specimen: Blood   Result Value Ref Range    Mumps IgG 213.0 Immune >10.9 AU/mL   Varicella Zoster Antibody, IgG    Specimen: Blood   Result Value Ref Range    Varicella IgG 483 Immune >165 index       Diagnoses and all orders for this visit:    1. Encounter by telehealth for suspected COVID-19 (Primary)  -     COVID-19,APTIMA PANTHER(SUSANA), ANDRE/ EMILIANA, NP/OP SWAB IN UTM/VTM/S[facility] TRANSPORT MEDIA,24 HR TAT - Swab, Nasopharynx; Future  -     QUESTIONNAIRE SERIES    2. Upper respiratory tract infection, unspecified type    continue OTC medications and increased decaffeinated fluids.   Quarantine until test results available.   Follow up with [facility] for worsening s/s.    The use of a video visit has been reviewed with the patient and verbal informd consent has een obtained. Myself and Yokasta Benitez participated in this visit. The patient is located in [address] [place] [address]. I am located in [place], [place]. Mychart and Zoom were utilized. I spent 15  minutes in the patient's chart for this visit.           Renata Contreras, APRN  11/14/2022  09:09 EST

## 2022-11-28 ENCOUNTER — HOSPITAL ENCOUNTER (OUTPATIENT)
Dept: GENERAL RADIOLOGY | Facility: HOSPITAL | Age: 57
Discharge: HOME | End: 2022-11-28
Admitting: INTERNAL MEDICINE

## 2022-11-28 DIAGNOSIS — R07.81 RIB PAIN ON RIGHT SIDE: ICD-10-CM

## 2022-11-28 PROCEDURE — 71101 X-RAY EXAM UNILAT RIBS/CHEST: CPT

## 2022-12-01 ENCOUNTER — APPOINTMENT (OUTPATIENT)
Dept: ULTRASOUND IMAGING | Facility: HOSPITAL | Age: 57
End: 2022-12-01

## 2022-12-01 ENCOUNTER — TRANSCRIBE ORDERS (OUTPATIENT)
Dept: ADMINISTRATIVE | Facility: HOSPITAL | Age: 57
End: 2022-12-01

## 2022-12-01 DIAGNOSIS — R10.9 ABDOMINAL PAIN, UNSPECIFIED ABDOMINAL LOCATION: Primary | ICD-10-CM

## 2022-12-02 ENCOUNTER — HOSPITAL ENCOUNTER (OUTPATIENT)
Dept: ULTRASOUND IMAGING | Facility: HOSPITAL | Age: 57
Discharge: HOME | End: 2022-12-02
Admitting: INTERNAL MEDICINE

## 2022-12-02 ENCOUNTER — APPOINTMENT (OUTPATIENT)
Dept: ULTRASOUND IMAGING | Facility: HOSPITAL | Age: 57
End: 2022-12-02

## 2022-12-02 DIAGNOSIS — R10.9 ABDOMINAL PAIN, UNSPECIFIED ABDOMINAL LOCATION: ICD-10-CM

## 2022-12-02 PROCEDURE — 76705 ECHO EXAM OF ABDOMEN: CPT

## 2023-10-09 PROBLEM — Z12.11 ENCOUNTER FOR SCREENING COLONOSCOPY: Status: ACTIVE | Noted: 2020-02-04

## 2024-08-27 ENCOUNTER — OFFICE VISIT (OUTPATIENT)
Dept: FAMILY MEDICINE CLINIC | Facility: CLINIC | Age: 59
End: 2024-08-27
Payer: COMMERCIAL

## 2024-08-27 VITALS
BODY MASS INDEX: 35.16 KG/M2 | WEIGHT: 211 LBS | HEIGHT: 65 IN | HEART RATE: 65 BPM | DIASTOLIC BLOOD PRESSURE: 70 MMHG | SYSTOLIC BLOOD PRESSURE: 146 MMHG | OXYGEN SATURATION: 98 %

## 2024-08-27 DIAGNOSIS — Z00.00 ENCOUNTER FOR MEDICAL EXAMINATION TO ESTABLISH CARE: Primary | ICD-10-CM

## 2024-08-27 DIAGNOSIS — I10 PRIMARY HYPERTENSION: ICD-10-CM

## 2024-08-27 DIAGNOSIS — Z12.31 SCREENING MAMMOGRAM, ENCOUNTER FOR: ICD-10-CM

## 2024-08-27 DIAGNOSIS — Z12.11 ENCOUNTER FOR SCREENING COLONOSCOPY: ICD-10-CM

## 2024-08-27 DIAGNOSIS — I15.9 SECONDARY HYPERTENSION: ICD-10-CM

## 2024-08-27 DIAGNOSIS — Z13.220 SCREENING CHOLESTEROL LEVEL: ICD-10-CM

## 2024-08-27 DIAGNOSIS — E66.9 OBESITY (BMI 30-39.9): ICD-10-CM

## 2024-08-27 DIAGNOSIS — Z11.59 NEED FOR HEPATITIS C SCREENING TEST: ICD-10-CM

## 2024-08-27 DIAGNOSIS — R06.81 APNEA: ICD-10-CM

## 2024-08-27 PROCEDURE — 99214 OFFICE O/P EST MOD 30 MIN: CPT | Performed by: STUDENT IN AN ORGANIZED HEALTH CARE EDUCATION/TRAINING PROGRAM

## 2024-08-27 RX ORDER — CHLORTHALIDONE 25 MG/1
25 TABLET ORAL DAILY
Qty: 90 TABLET | Refills: 1 | Status: SHIPPED | OUTPATIENT
Start: 2024-08-27

## 2024-08-27 RX ORDER — LOSARTAN POTASSIUM 50 MG/1
50 TABLET ORAL 2 TIMES DAILY
Qty: 180 TABLET | Refills: 6 | Status: SHIPPED | OUTPATIENT
Start: 2024-08-27

## 2024-08-27 RX ORDER — METOPROLOL SUCCINATE 100 MG/1
100 TABLET, EXTENDED RELEASE ORAL 2 TIMES DAILY
Qty: 180 TABLET | Refills: 6 | Status: SHIPPED | OUTPATIENT
Start: 2024-08-27

## 2024-08-27 NOTE — PROGRESS NOTES
Subjective:       Yokasta Benitez is a 59 y.o. female with a concurrent medical history of essential hypertension, obesity and recurrent corneal erosions and a past medical who presents to establish care.     Hypertension - BP for us is uncontrolled at 177/79.  Gait improved to 146/70 on manual repeat but this would still not be controlled.  Goal for her would be a systolic blood pressure less than 140.  Medications include losartan 50 mg BID and metoprolol succinate 100 mg BID.     Would start chlorthalidone 25 mg.  Did tell her to check blood pressure at home and if she develops hypotension on this medication to let me know and we can discontinue or try cutting dose in half.  She denies history of gout for which I would avoid starting a thiazide diuretic.    Due for repeat CMP and will order this today.     Given that she now requires 3 medications for blood pressure control, would workup for secondary causes of hypertension today.  She does report snoring and would refer to sleep medicine given BMI of 35.  Would obtain TSH to evaluate thyroid function.  Would obtain serum metanephrines to evaluate for pheochromocytoma.  Would obtain 24-hour urine cortisol to assess for Cushing's and would obtain renin aldosterone ratio as well as renal artery ultrasound to rule out primary hyperaldosteronism and renal artery stenosis, respectively.    In terms of blood pressure, would follow-up every 3 months until this is controlled.      In terms of obesity, she has a BMI of 35.  She is failed to lose weight with diet and lifestyle modifications alone.  We discussed potential use of GLP-1's and she denies contraindications such as thyroid cancer and has an inability become pregnant as she has had a hysterectomy and is 59.  We discussed side effects including acute necrotizing pancreatitis more rarely and more commonly nausea, vomiting, and abdominal pain.  Will likely start Wegovy based on lab results.  She would then need  follow-up every month while we are titrating medicine until we achieve a stable clip of weight loss      Sees an ophthalmologist from recurrent corneal erosions. Wears a band-aid contact lenses.     No colon cancer screen history, will refer for colonoscopy.     Last mammogram was a few years ago.     History of complete hysterectomy, does not need PAP smear.     The following portions of the patient's history were reviewed and updated as appropriate: allergies, current medications, past family history, past medical history, past social history, past surgical history, and problem list.    Past Medical Hx:  Past Medical History:   Diagnosis Date    Allergic     Erythromycin    Hypertension 10/2018    Pneumonia     Covid       Past Surgical Hx:  Past Surgical History:   Procedure Laterality Date    ADENOIDECTOMY      BREAST BIOPSY      EYE SURGERY      PTK for recurrent corneal erosion    HYSTERECTOMY      TONSILLECTOMY         Current Meds:    Current Outpatient Medications:     losartan (Cozaar) 50 MG tablet, Take 1 tablet by mouth 2 (Two) Times a Day., Disp: 180 tablet, Rfl: 6    metoprolol succinate XL (Toprol XL) 100 MG 24 hr tablet, Take 1 tablet by mouth twice daily, Disp: 180 tablet, Rfl: 6    chlorthalidone (HYGROTON) 25 MG tablet, Take 1 tablet by mouth Daily., Disp: 90 tablet, Rfl: 1    Allergies:  Allergies   Allergen Reactions    Erythromycin Hives, Diarrhea and Nausea And Vomiting       Family Hx:  Family History   Problem Relation Age of Onset    Cancer Father          within 6 weeks of diagnosis        Social History:  Social History     Socioeconomic History    Marital status:    Tobacco Use    Smoking status: Former     Current packs/day: 0.00     Average packs/day: 1 pack/day for 16.7 years (16.7 ttl pk-yrs)     Types: Cigarettes     Start date: 1985     Quit date: 2002     Years since quittin.5    Smokeless tobacco: Never   Vaping Use    Vaping status:  "Never Used   Substance and Sexual Activity    Alcohol use: Yes     Alcohol/week: 5.0 standard drinks of alcohol     Types: 5 Glasses of wine per week    Drug use: Never    Sexual activity: Yes     Partners: Male       Review of Systems  Review of Systems   Respiratory:  Positive for apnea.    Cardiovascular:  Positive for leg swelling.       Objective:     /70   Pulse 65   Ht 165.1 cm (65\")   Wt 95.7 kg (211 lb)   SpO2 98%   BMI 35.11 kg/m²   Physical Exam  Constitutional:       General: She is not in acute distress.     Appearance: Normal appearance. She is obese. She is not ill-appearing, toxic-appearing or diaphoretic.   Cardiovascular:      Rate and Rhythm: Normal rate and regular rhythm.   Pulmonary:      Effort: Pulmonary effort is normal.      Breath sounds: Normal breath sounds.   Neurological:      Mental Status: She is alert.   Psychiatric:         Mood and Affect: Mood normal.         Behavior: Behavior normal.          Assessment/Plan:     Diagnoses and all orders for this visit:    1. Encounter for medical examination to establish care (Primary)    2. Primary hypertension  7.  Resistant hypertension     Hypertension - BP for us is uncontrolled at 177/79.  Gait improved to 146/70 on manual repeat but this would still not be controlled.  Goal for her would be a systolic blood pressure less than 140.  Medications include losartan 50 mg BID and metoprolol succinate 100 mg BID.     Would start chlorthalidone 25 mg.  Did tell her to check blood pressure at home and if she develops hypotension on this medication to let me know and we can discontinue or try cutting dose in half.  She denies history of gout for which I would avoid starting a thiazide diuretic.    Due for repeat CMP and will order this today.     Given that she now requires 3 medications for blood pressure control, would workup for secondary causes of hypertension today.  She does report snoring and would refer to sleep medicine given " BMI of 35.  Would obtain TSH to evaluate thyroid function.  Would obtain serum metanephrines to evaluate for pheochromocytoma.  Would obtain 24-hour urine cortisol to assess for Cushing's and would obtain renin aldosterone ratio as well as renal artery ultrasound to rule out primary hyperaldosteronism and renal artery stenosis, respectively.    In terms of blood pressure, would follow-up every 3 months until this is controlled.    -     Comprehensive metabolic panel; Future  -     CBC No Differential; Future  -     losartan (Cozaar) 50 MG tablet; Take 1 tablet by mouth 2 (Two) Times a Day.  Dispense: 180 tablet; Refill: 6  -     metoprolol succinate XL (Toprol XL) 100 MG 24 hr tablet; Take 1 tablet by mouth twice daily  Dispense: 180 tablet; Refill: 6  -     chlorthalidone (HYGROTON) 25 MG tablet; Take 1 tablet by mouth Daily.  Dispense: 90 tablet; Refill: 1  -     TSH+Free T4; Future  -     Aldosterone / Renin Ratio; Future  -     Ambulatory Referral to Sleep Medicine  -     Duplex Renal Artery - Bilateral Complete CAR; Future  -     Cortisol, Urine, Free 24Hr - Urine, Clean Catch; Future  -     Metanephrines, Frac. Free, Plasma; Future          3. Screening cholesterol level  -     Lipid panel; Future    4. Need for hepatitis C screening test  -     Hepatitis C antibody; Future    5. Encounter for screening colonoscopy  -     Ambulatory Referral For Screening Colonoscopy    6. Screening mammogram, encounter for  -     Mammo Screening Digital Tomosynthesis Bilateral With CAD; Future      8. Apnea  -     Ambulatory Referral to Sleep Medicine  -     Duplex Renal Artery - Bilateral Complete CAR; Future    9. Obesity (BMI 30-39.9)    In terms of obesity, she has a BMI of 35.  She is failed to lose weight with diet and lifestyle modifications alone.  We discussed potential use of GLP-1's and she denies contraindications such as thyroid cancer and has an inability become pregnant as she has had a hysterectomy and is 59.   We discussed side effects including acute necrotizing pancreatitis more rarely and more commonly nausea, vomiting, and abdominal pain.  Will likely start Wegovy based on lab results.  She would then need follow-up every month while we are titrating medicine until we achieve a stable clip of weight loss        Rx changes: Starting chlorthalidone 25 mg for risk of hypertension    Follow-up:     Return in about 3 months (around 11/27/2024) for Hypertension.    Preventative:  Health Maintenance   Topic Date Due    COLORECTAL CANCER SCREENING  Never done    HEPATITIS C SCREENING  Never done    LIPID PANEL  10/18/2019    MAMMOGRAM  11/11/2021    ANNUAL PHYSICAL  01/03/2023    BMI FOLLOWUP  01/03/2023    INFLUENZA VACCINE  08/01/2024    COVID-19 Vaccine (2 - 2023-24 season) 08/28/2024 (Originally 9/1/2023)    TDAP/TD VACCINES (1 - Tdap) 08/28/2024 (Originally 7/9/1982)    ZOSTER VACCINE (2 of 2) 08/28/2024 (Originally 4/4/2023)    Pneumococcal Vaccine 0-64  Aged Out    PAP SMEAR  Discontinued         This document has been electronically signed by Alfonso Li MD on August 27, 2024 14:08 EDT       Parts of this note are electronic transcriptions/translations of spoken language to printed text using the Dragon Dictation system.    Detail Level: Generalized Detail Level: Simple Detail Level: Zone Patient Specific Counseling (Will Not Stick From Patient To Patient): Recommended microneedling with Viry

## 2024-08-28 ENCOUNTER — OFFICE VISIT (OUTPATIENT)
Dept: SLEEP MEDICINE | Facility: HOSPITAL | Age: 59
End: 2024-08-28
Payer: COMMERCIAL

## 2024-08-28 VITALS
SYSTOLIC BLOOD PRESSURE: 150 MMHG | HEIGHT: 65 IN | HEART RATE: 70 BPM | BODY MASS INDEX: 35.16 KG/M2 | OXYGEN SATURATION: 99 % | WEIGHT: 211 LBS | DIASTOLIC BLOOD PRESSURE: 74 MMHG

## 2024-08-28 DIAGNOSIS — R06.83 SNORING: ICD-10-CM

## 2024-08-28 DIAGNOSIS — I10 UNCONTROLLED HYPERTENSION: ICD-10-CM

## 2024-08-28 DIAGNOSIS — E66.9 OBESITY (BMI 30-39.9): ICD-10-CM

## 2024-08-28 DIAGNOSIS — R29.818 SUSPECTED SLEEP APNEA: Primary | ICD-10-CM

## 2024-08-28 DIAGNOSIS — G47.63 SLEEP-RELATED BRUXISM: ICD-10-CM

## 2024-08-28 PROCEDURE — G0463 HOSPITAL OUTPT CLINIC VISIT: HCPCS

## 2024-08-28 NOTE — PROGRESS NOTES
Sleep Consultation    Patient Name: Yokasta Benitez  Age/Sex: 59 y.o. female  : 1965  MRN: [id number]    Date of Encounter Visit: 2024  Encounter Provider: Rabia Yadav MD  Referring Provider: Alfonso Li MD  Place of Service: [facility]  Patient Care Team:  Alfonso Li MD as PCP - General (Family Medicine)    Subjective:     Reason for Consult: Evaluation for sleep apnea    History of Present Illness:  Yokasta Benitez is a 59 y.o. female is here for evaluation of MARIAJOSE due to suggestive symptoms.    Patient complains of daytime fatigue and sleepiness with an Rudy Sleepiness Scale (ESS) of 4.  Patient complains of snoring, EDS, awakened gasping/choking, morning headaches, and dry mouth.   She does have nocturnal bruxism and mild nocturia with 2-3 trips to the restroom.   Denies any symptoms of restless leg syndrome.   Patient denies any cataplexy, sleep paralysis or other symptoms to suggest narcolepsy.  Patient denies any parasomnias.  Denies any history of seizure disorder or recent head trauma.  Patient spends adequate amount of time in bed with no evidence of sleep restriction or improper sleep hygiene.   TIB: 10PM-4-6AM, gets about 6-7 hours of sleep, takes a few minutes to fall asleep   Caffeine intake: 1-2 coffees daily  Alcohol intake: 2 glasses of wine per week   Comorbidities include: HTN, HLD, and obesity     Review of Systems:   A twelve-system review was conducted and was negative except for the following: nasal congestion, SOA, Wheeze.        Past Medical History:  Past Medical History:   Diagnosis Date    Allergic     Erythromycin    Hypertension 10/2018    Pneumonia 2020    Covid       Past Surgical History:   Procedure Laterality Date    ADENOIDECTOMY  1968    BREAST BIOPSY      EYE SURGERY  2014    PTK for recurrent corneal erosion    HYSTERECTOMY  2003    TONSILLECTOMY  1968       Home Medications:     Current Outpatient Medications:      "chlorthalidone (HYGROTON) 25 MG tablet, Take 1 tablet by mouth Daily., Disp: 90 tablet, Rfl: 1    losartan (Cozaar) 50 MG tablet, Take 1 tablet by mouth 2 (Two) Times a Day., Disp: 180 tablet, Rfl: 6    metoprolol succinate XL (Toprol XL) 100 MG 24 hr tablet, Take 1 tablet by mouth twice daily, Disp: 180 tablet, Rfl: 6    Allergies:  Allergies   Allergen Reactions    Erythromycin Hives, Diarrhea and Nausea And Vomiting       Past Social History:  Social History     Socioeconomic History    Marital status:    Tobacco Use    Smoking status: Former     Current packs/day: 0.00     Average packs/day: 1 pack/day for 16.7 years (16.7 ttl pk-yrs)     Types: Cigarettes     Start date: 1985     Quit date: 2002     Years since quittin.5    Smokeless tobacco: Never   Vaping Use    Vaping status: Never Used   Substance and Sexual Activity    Alcohol use: Yes     Alcohol/week: 5.0 standard drinks of alcohol     Types: 5 Glasses of wine per week    Drug use: Never    Sexual activity: Yes     Partners: Male       Past Family History:  Family History   Problem Relation Age of Onset    Thyroid disease Mother     Cancer Father          within 6 weeks of diagnosis     Negative for sleep disorders   Objective:        Vital Signs:   Visit Vitals  /74 (BP Location: Left arm, Patient Position: Sitting)   Pulse 70   Ht 165.1 cm (65\")   Wt 95.7 kg (211 lb)   SpO2 99%   BMI 35.11 kg/m²     Wt Readings from Last 3 Encounters:   24 95.7 kg (211 lb)   24 95.7 kg (211 lb)   22 86.2 kg (190 lb)     Neck Circumference: 14.5 inches    Physical Exam:   GEN:  No acute distress, alert, cooperative, well developed, obese   EYES:   Sclerae clear. No icterus. PERRL. Normal EOM  ENT:   External ears/nose normal, no oral lesions, no thrush, mucous membranes moist, Septum midline. Mallampati IV airway. Redundant membranous soft palate, normal uvula    NECK:  Supple, midline trachea, no JVD  LUNGS: Normal chest " on inspection, CTAB, no wheezes. No rhonchi. No crackles. Respirations regular, even and unlabored.   CV:  Regular rhythm and rate. Normal S1/S2. No murmurs, gallops, or rubs noted.  ABD:  Soft, nontender and nondistended. Normal bowel sounds. No guarding  EXT:  Moves all extremities well. No cyanosis. No redness. Trace edema.   Skin: Dry, intact, no bleeding      Diagnostic Data:  No prior sleep studies performed     Assessment and Plan:       ICD-10-CM ICD-9-CM   1. Suspected sleep apnea  R29.818 781.99   2. Uncontrolled hypertension  I10 401.9   3. Snoring  R06.83 786.09   4. Obesity (BMI 30-39.9)  E66.9 278.00   5. Sleep-related bruxism  G47.63 327.53       Recommendations:     Patient is a good candidate for the home sleep study which will be ordered today  If the home sleep study is inconclusive or nondiagnostic, we will consider the in-lab sleep study  Patient is agreeable with the CPAP therapy and will be initiated accordingly      Patient was educated in depth about MARIAJOSE and cardiovascular consequences if left untreated, including but not limited to CHF, CAD, arrhythmias, CVA, and/or HTN. Education also provided about the diagnostic tools for MARIAJOSE, including the polysomnography and the treatment modalities, including the CPAP.     If patient has obstructive sleep apnea the recommend treatment is CPAP and will start CPAP and patient will follow up within 31-90 days after starting CPAP for compliance review.   Will address alternative treatment option if intolerant to CPAP     Adherence to the CPAP is a key factor in successful treatment of MARIAJOSE and the patient was encouraged to contact us in case of problem with the CPAP or the mask that can limit the tolerance of the compliance with the therapy.    Patient was educated about the impact of obesity on sleep apnea and the benefit of weight loss and weight loss was recommended    Orders Placed This Encounter   Procedures    Home Sleep Study     No orders of the  defined types were placed in this encounter.     Return in about 3 months (around 11/28/2024).    Rabia Yadav MD   [facility]   08/28/24  16:07 EDT    Dictated utilizing Dragon dictation

## 2024-08-30 ENCOUNTER — HOSPITAL ENCOUNTER (OUTPATIENT)
Dept: SLEEP MEDICINE | Facility: HOSPITAL | Age: 59
Discharge: HOME | End: 2024-08-30
Admitting: INTERNAL MEDICINE
Payer: COMMERCIAL

## 2024-08-30 DIAGNOSIS — E66.9 OBESITY (BMI 30-39.9): ICD-10-CM

## 2024-08-30 DIAGNOSIS — I10 UNCONTROLLED HYPERTENSION: ICD-10-CM

## 2024-08-30 DIAGNOSIS — R06.83 SNORING: ICD-10-CM

## 2024-08-30 DIAGNOSIS — R29.818 SUSPECTED SLEEP APNEA: ICD-10-CM

## 2024-08-30 PROCEDURE — 95806 SLEEP STUDY UNATT&RESP EFFT: CPT

## 2024-09-05 ENCOUNTER — TELEPHONE (OUTPATIENT)
Dept: FAMILY MEDICINE CLINIC | Facility: CLINIC | Age: 59
End: 2024-09-05

## 2024-09-05 NOTE — TELEPHONE ENCOUNTER
Caller: Yokasta Benitez    Relationship to patient: Self    Best call back number: [phone]     Patient is needing: PATIENT CALLED IN STATING SHE WOULD LIKE TO DO THE COLOGUARD TESTING IN PLACE OF THE COLONOSCOPY. PLEASE CALL TO ADVISE.

## 2024-09-06 DIAGNOSIS — Z12.11 COLON CANCER SCREENING: Primary | ICD-10-CM

## 2024-09-06 NOTE — TELEPHONE ENCOUNTER
I have placed the order for Cologuard.  No callback is needed.      This document has been electronically signed by Alfonso Li MD on September 6, 2024 08:46 EDT

## 2024-09-16 ENCOUNTER — LAB (OUTPATIENT)
Dept: LAB | Facility: HOSPITAL | Age: 59
End: 2024-09-16
Payer: COMMERCIAL

## 2024-09-16 DIAGNOSIS — I10 PRIMARY HYPERTENSION: ICD-10-CM

## 2024-09-16 DIAGNOSIS — I15.9 SECONDARY HYPERTENSION: ICD-10-CM

## 2024-09-16 DIAGNOSIS — R74.01 TRANSAMINITIS: Primary | ICD-10-CM

## 2024-09-16 DIAGNOSIS — R73.9 HYPERGLYCEMIA: Primary | ICD-10-CM

## 2024-09-16 DIAGNOSIS — Z11.59 NEED FOR HEPATITIS C SCREENING TEST: ICD-10-CM

## 2024-09-16 DIAGNOSIS — Z13.220 SCREENING CHOLESTEROL LEVEL: ICD-10-CM

## 2024-09-16 LAB
ALBUMIN SERPL-MCNC: 4.6 G/DL (ref 3.5–5.2)
ALBUMIN/GLOB SERPL: 1.8 G/DL
ALP SERPL-CCNC: 90 U/L (ref 39–117)
ALT SERPL W P-5'-P-CCNC: 76 U/L (ref 1–33)
ANION GAP SERPL CALCULATED.3IONS-SCNC: 11.7 MMOL/L (ref 5–15)
AST SERPL-CCNC: 60 U/L (ref 1–32)
BILIRUB SERPL-MCNC: 0.5 MG/DL (ref 0–1.2)
BUN SERPL-MCNC: 16 MG/DL (ref 8–23)
BUN/CREAT SERPL: 21.6 (ref 7–25)
CALCIUM SPEC-SCNC: 9.7 MG/DL (ref 8.6–10.5)
CHLORIDE SERPL-SCNC: 96 MMOL/L (ref 98–107)
CHOLEST SERPL-MCNC: 234 MG/DL (ref 0–200)
CO2 SERPL-SCNC: 28.3 MMOL/L (ref 22–29)
CREAT SERPL-MCNC: 0.74 MG/DL (ref 0.57–1)
DEPRECATED RDW RBC AUTO: 39.6 FL (ref 37–54)
EGFRCR SERPLBLD CKD-EPI 2021: 92.2 ML/MIN/1.73
ERYTHROCYTE [DISTWIDTH] IN BLOOD BY AUTOMATED COUNT: 12 % (ref 12.3–15.4)
GLOBULIN UR ELPH-MCNC: 2.6 GM/DL
GLUCOSE SERPL-MCNC: 182 MG/DL (ref 65–99)
HCT VFR BLD AUTO: 43.5 % (ref 34–46.6)
HCV AB SER QL: NORMAL
HDLC SERPL-MCNC: 44 MG/DL (ref 40–60)
HGB BLD-MCNC: 14.7 G/DL (ref 12–15.9)
LDLC SERPL CALC-MCNC: 166 MG/DL (ref 0–100)
LDLC/HDLC SERPL: 3.73 {RATIO}
MCH RBC QN AUTO: 30.8 PG (ref 26.6–33)
MCHC RBC AUTO-ENTMCNC: 33.8 G/DL (ref 31.5–35.7)
MCV RBC AUTO: 91 FL (ref 79–97)
PLATELET # BLD AUTO: 208 10*3/MM3 (ref 140–450)
PMV BLD AUTO: 9.9 FL (ref 6–12)
POTASSIUM SERPL-SCNC: 4 MMOL/L (ref 3.5–5.2)
PROT SERPL-MCNC: 7.2 G/DL (ref 6–8.5)
RBC # BLD AUTO: 4.78 10*6/MM3 (ref 3.77–5.28)
SODIUM SERPL-SCNC: 136 MMOL/L (ref 136–145)
T4 FREE SERPL-MCNC: 1.17 NG/DL (ref 0.92–1.68)
TRIGL SERPL-MCNC: 130 MG/DL (ref 0–150)
TSH SERPL DL<=0.05 MIU/L-ACNC: 3.04 UIU/ML (ref 0.27–4.2)
VLDLC SERPL-MCNC: 24 MG/DL (ref 5–40)
WBC NRBC COR # BLD AUTO: 7.24 10*3/MM3 (ref 3.4–10.8)

## 2024-09-16 PROCEDURE — 84439 ASSAY OF FREE THYROXINE: CPT

## 2024-09-16 PROCEDURE — 82530 CORTISOL FREE: CPT

## 2024-09-16 PROCEDURE — 82088 ASSAY OF ALDOSTERONE: CPT

## 2024-09-16 PROCEDURE — 84244 ASSAY OF RENIN: CPT

## 2024-09-16 PROCEDURE — 80050 GENERAL HEALTH PANEL: CPT

## 2024-09-16 PROCEDURE — 83835 ASSAY OF METANEPHRINES: CPT

## 2024-09-16 PROCEDURE — 86803 HEPATITIS C AB TEST: CPT

## 2024-09-16 PROCEDURE — 81050 URINALYSIS VOLUME MEASURE: CPT

## 2024-09-16 PROCEDURE — 36415 COLL VENOUS BLD VENIPUNCTURE: CPT

## 2024-09-16 PROCEDURE — 80061 LIPID PANEL: CPT

## 2024-09-20 ENCOUNTER — TELEPHONE (OUTPATIENT)
Dept: FAMILY MEDICINE CLINIC | Facility: CLINIC | Age: 59
End: 2024-09-20
Payer: COMMERCIAL

## 2024-09-20 ENCOUNTER — TELEPHONE (OUTPATIENT)
Dept: SLEEP MEDICINE | Facility: HOSPITAL | Age: 59
End: 2024-09-20
Payer: COMMERCIAL

## 2024-09-20 DIAGNOSIS — G47.34 SLEEP RELATED HYPOXIA: ICD-10-CM

## 2024-09-20 DIAGNOSIS — G47.33 OSA (OBSTRUCTIVE SLEEP APNEA): Primary | ICD-10-CM

## 2024-09-21 LAB
METANEPH FREE SERPL-MCNC: <25 PG/ML (ref 0–88)
NORMETANEPHRINE SERPL-MCNC: 60 PG/ML (ref 0–285.2)

## 2024-09-22 LAB
CORTIS F 24H UR-MCNC: 5 UG/L
CORTIS F 24H UR-MRATE: 11 UG/24 HR (ref 6–42)

## 2024-09-23 LAB
ALDOST SERPL-MCNC: 8.9 NG/DL (ref 0–30)
ALDOST/RENIN PLAS-RTO: 0.8 {RATIO} (ref 0–30)
RENIN PLAS-CCNC: 11.77 NG/ML/HR (ref 0.17–5.38)

## 2024-09-25 DIAGNOSIS — R79.89 HIGH SERUM RENIN: ICD-10-CM

## 2024-09-25 DIAGNOSIS — I10 HYPERTENSION, UNSPECIFIED TYPE: Primary | ICD-10-CM

## 2024-10-10 ENCOUNTER — HOSPITAL ENCOUNTER (OUTPATIENT)
Dept: CARDIOLOGY | Facility: HOSPITAL | Age: 59
Discharge: HOME | End: 2024-10-10
Payer: COMMERCIAL

## 2024-10-10 ENCOUNTER — HOSPITAL ENCOUNTER (OUTPATIENT)
Dept: MAMMOGRAPHY | Facility: HOSPITAL | Age: 59
Discharge: HOME | End: 2024-10-10
Payer: COMMERCIAL

## 2024-10-10 DIAGNOSIS — Z12.31 SCREENING MAMMOGRAM, ENCOUNTER FOR: ICD-10-CM

## 2024-10-10 DIAGNOSIS — I15.9 SECONDARY HYPERTENSION: ICD-10-CM

## 2024-10-10 DIAGNOSIS — R06.81 APNEA: ICD-10-CM

## 2024-10-10 LAB
BH CV ECHO MEAS - DIST REN A EDV LEFT: 33.5 CM/S
BH CV ECHO MEAS - DIST REN A PSV LEFT: 126.1 CM/S
BH CV ECHO MEAS - MID REN A EDV LEFT: 32.6 CM/S
BH CV ECHO MEAS - MID REN A PSV LEFT: 157 CM/S
BH CV ECHO MEAS - PROX REN A EDV LEFT: 18 CM/S
BH CV ECHO MEAS - PROX REN A PSV LEFT: 121.8 CM/S
BH CV VAS BP LEFT ARM: NORMAL MMHG
BH CV VAS BP RIGHT ARM: NORMAL MMHG
BH CV VAS KIDNEY HEIGHT LEFT: 4.4 CM
BH CV VAS RENAL AORTIC MID EDV: 16.6 CM/S
BH CV VAS RENAL AORTIC MID PSV: 87.1 CM/S
BH CV VAS RENAL HILUM LEFT EDV: 14.6 CM/S
BH CV VAS RENAL HILUM LEFT PSV: 46.8 CM/S
BH CV VAS RENAL HILUM RIGHT EDV: 15 CM/S
BH CV VAS RENAL HILUM RIGHT PSV: 47.6 CM/S
BH CV XLRA MEAS - KID L LEFT: 11 CM
BH CV XLRA MEAS DIST REN A EDV RIGHT: 43.8 CM/S
BH CV XLRA MEAS DIST REN A PSV RIGHT: 134.7 CM/S
BH CV XLRA MEAS KID H RIGHT: 3.7 CM
BH CV XLRA MEAS KID L RIGHT: 10.6 CM
BH CV XLRA MEAS KID W RIGHT: 3.8 CM
BH CV XLRA MEAS MID REN A EDV RIGHT: 60 CM/S
BH CV XLRA MEAS MID REN A PSV RIGHT: 173.3 CM/S
BH CV XLRA MEAS PROX REN A EDV RIGHT: 50.6 CM/S
BH CV XLRA MEAS PROX REN A PSV RIGHT: 138.1 CM/S
BH CV XLRA MEAS RENAL A ORG EDV LEFT: 45.5 CM/S
BH CV XLRA MEAS RENAL A ORG EDV RIGHT: 52.3 CM/S
BH CV XLRA MEAS RENAL A ORG PSV LEFT: 128.7 CM/S
BH CV XLRA MEAS RENAL A ORG PSV RIGHT: 172.4 CM/S
LEFT KIDNEY WIDTH: 4.3 CM
LEFT RENAL UPPER PARENCHYMA MAX: 27.5 CM/S
LEFT RENAL UPPER PARENCHYMA MIN: 8.2 CM/S
LEFT RENAL UPPER PARENCHYMA RI: 0.7
RIGHT RENAL UPPER PARENCHYMA MAX: 26.6 CM/S
RIGHT RENAL UPPER PARENCHYMA MIN: 9.4 CM/S
RIGHT RENAL UPPER PARENCHYMA RI: 0.65

## 2024-10-10 PROCEDURE — 93975 VASCULAR STUDY: CPT

## 2024-10-10 PROCEDURE — 77063 BREAST TOMOSYNTHESIS BI: CPT

## 2024-10-10 PROCEDURE — 77067 SCR MAMMO BI INCL CAD: CPT

## 2024-10-11 DIAGNOSIS — R92.8 ABNORMAL MAMMOGRAM OF LEFT BREAST: Primary | ICD-10-CM

## 2024-10-11 NOTE — PROGRESS NOTES
I have reviewed the results of the mammogram.  There is a 9 mm focal asymmetry in the left breast that is seen and a diagnostic left mammogram and ultrasound has been requested by radiology.  I have ordered this today.    ?  This document has been electronically signed by Alfonso Li MD on October 11, 2024 07:53 EDT

## 2024-10-15 DIAGNOSIS — I10 PRIMARY HYPERTENSION: ICD-10-CM

## 2024-10-15 DIAGNOSIS — I70.1 RENAL ARTERY STENOSIS: Primary | ICD-10-CM

## 2024-10-22 ENCOUNTER — HOSPITAL ENCOUNTER (OUTPATIENT)
Dept: ULTRASOUND IMAGING | Facility: HOSPITAL | Age: 59
Discharge: HOME | End: 2024-10-22
Payer: COMMERCIAL

## 2024-10-22 ENCOUNTER — APPOINTMENT (OUTPATIENT)
Dept: ULTRASOUND IMAGING | Facility: HOSPITAL | Age: 59
End: 2024-10-22
Payer: COMMERCIAL

## 2024-10-22 DIAGNOSIS — R74.01 TRANSAMINITIS: ICD-10-CM

## 2024-10-22 DIAGNOSIS — I10 HYPERTENSION, UNSPECIFIED TYPE: ICD-10-CM

## 2024-10-22 DIAGNOSIS — R79.89 HIGH SERUM RENIN: ICD-10-CM

## 2024-10-22 PROCEDURE — 76775 US EXAM ABDO BACK WALL LIM: CPT

## 2024-10-22 PROCEDURE — 76705 ECHO EXAM OF ABDOMEN: CPT

## 2024-10-23 DIAGNOSIS — K76.89 LIVER CYST: Primary | ICD-10-CM

## 2024-10-23 NOTE — PROGRESS NOTES
I have reviewed results of liver ultrasound, obtained due to transaminitis. This shows signs of hepatic steatosis, but also multiple cysts - including septated cysts - in the liver. Based on read, I think we should obtain an MRI to further categorize this process. Please let patient know I have ordered this test.     Thank you,    Alfonso Li     ?  This document has been electronically signed by Alfonso Li MD on October 23, 2024 19:47 EDT

## 2024-11-19 ENCOUNTER — HOSPITAL ENCOUNTER (OUTPATIENT)
Dept: MAMMOGRAPHY | Facility: HOSPITAL | Age: 59
Discharge: HOME | End: 2024-11-19
Payer: COMMERCIAL

## 2024-11-19 ENCOUNTER — HOSPITAL ENCOUNTER (OUTPATIENT)
Dept: ULTRASOUND IMAGING | Facility: HOSPITAL | Age: 59
Discharge: HOME | End: 2024-11-19
Payer: COMMERCIAL

## 2024-11-19 DIAGNOSIS — R92.8 ABNORMAL MAMMOGRAM OF LEFT BREAST: ICD-10-CM

## 2024-11-19 PROCEDURE — 76642 ULTRASOUND BREAST LIMITED: CPT

## 2024-11-19 PROCEDURE — G0279 TOMOSYNTHESIS, MAMMO: HCPCS

## 2024-11-19 PROCEDURE — 77065 DX MAMMO INCL CAD UNI: CPT

## 2024-11-19 NOTE — PROGRESS NOTES
I have reviewed results of diagnostic mammogram.  The 9 mm low-density mass in the upper left breast in the 6 mm oval low-density mass in the outer left breast were noted to be benign cysts by radiologist.  They recommend repeating mammogram in 1 year.    Thank you,     Alfonso Li    ?  This document has been electronically signed by Alfonso Li MD on November 19, 2024 14:09 EST

## 2024-12-03 ENCOUNTER — OFFICE VISIT (OUTPATIENT)
Dept: FAMILY MEDICINE CLINIC | Facility: CLINIC | Age: 59
End: 2024-12-03
Payer: COMMERCIAL

## 2024-12-03 VITALS
WEIGHT: 201.6 LBS | OXYGEN SATURATION: 100 % | HEART RATE: 75 BPM | BODY MASS INDEX: 33.59 KG/M2 | HEIGHT: 65 IN | DIASTOLIC BLOOD PRESSURE: 92 MMHG | SYSTOLIC BLOOD PRESSURE: 138 MMHG

## 2024-12-03 DIAGNOSIS — E66.9 OBESITY (BMI 30-39.9): ICD-10-CM

## 2024-12-03 DIAGNOSIS — I10 HYPERTENSION, UNSPECIFIED TYPE: Primary | ICD-10-CM

## 2024-12-03 DIAGNOSIS — R73.9 HYPERGLYCEMIA: ICD-10-CM

## 2024-12-03 DIAGNOSIS — R93.2 ABNORMAL LIVER ULTRASOUND: ICD-10-CM

## 2024-12-03 LAB
EXPIRATION DATE: ABNORMAL
HBA1C MFR BLD: 6 % (ref 4.5–5.7)
Lab: ABNORMAL

## 2024-12-03 PROCEDURE — 99214 OFFICE O/P EST MOD 30 MIN: CPT | Performed by: STUDENT IN AN ORGANIZED HEALTH CARE EDUCATION/TRAINING PROGRAM

## 2024-12-03 PROCEDURE — 83036 HEMOGLOBIN GLYCOSYLATED A1C: CPT | Performed by: STUDENT IN AN ORGANIZED HEALTH CARE EDUCATION/TRAINING PROGRAM

## 2024-12-03 NOTE — PROGRESS NOTES
Subjective:       Yokasta Benitez is a 59 y.o. female with a concurrent medical history of essential hypertension, obesity, recurrent corneal erosions and renal artery stenosis and MARIAJOSE who presents for 3-month follow-up for hypertension.      At our 8/27/2024 visit to establish care, we discussed blood pressure which was uncontrolled at 146/70 on manual recheck for us.  At that time she was on losartan 50 mg twice daily and metoprolol succinate 100 mg twice daily.  I had initiated treat with chlorthalidone 25 mg.  We also started workup for secondary causes of hypertension.  Imaging was suggestive of renal artery stenosis and on that basis referred her to nephrology.    I have reviewed nephrology note from her 11/22/2024 appointment.  As she experienced some orthostatic signs on the 50 mg losartan twice daily she was decreased to the 50 mg daily dose.  She was also encouraged to follow a low-sodium diet.  It was thought that the mild to moderate right renal artery stenosis and left renal artery stenosis was not likely to contribute to uncontrolled hypertension at that time.  It appears she has follow-up with nephrology in January of next year and I would encourage her to continue to follow-up.  We are very grateful to nephrology for seeing her.    Today she presents for 3-month follow-up for hypertension. Blood pressure for us today is initially elevated at 147/80.  However, she tells me that it is in the 110s to 130s systolic at home.  We recheck blood pressure manually and it was Improved at 138/92.  Although diastolic is a little higher than where I would like it, based on her values at home, I think for the time being we will de-escalate her appointments to every 6 months.    She did initially have some dizziness after starting chlorthalidone.  Looking at nephrology's note it does suggest this could be orthostatic in nature.  They decreased losartan and I told her to let me know if she continues to have  dizziness or orthostatic signs after her body has adjusted to this new dose.  She says currently the dizziness is only intermittent and does not significantly impact her quality of life.    Would see back at six months intervals for now in terms of this.         In terms of liver ultrasound did show hepatic steatosis but also cysts of the liver.  We have ordered MRI to further evaluate.  Based on results, if benign cysts would consider starting GLP-1 to help treat obesity with BMI of 33 and comorbid MARIAJOSE , resistant hypertension, hepatic steatosis and prediabetes.       Point-of-care hemoglobin A1c obtained due to hyperglycemia on last CMP.  Today, it demonstrates a score of 6, consistent with prediabetes.  We discussed lifestyle/dietary modifications.  We will likely be starting GLP-1 and we will be discussing this in upcoming visits.      She had also been referred to sleep medicine for concerning symptoms suggestive of sleep apnea.  It appears she has been thought to be a good candidate for home sleep study.  Today, she tells me she was diagnosed with MARIAJOSE. She cannot tolerate the CPAP.  We may work to achieve weight loss with GLP-1 pending results of liver MRI.    The following portions of the patient's history were reviewed and updated as appropriate: allergies, current medications, past family history, past medical history, past social history, past surgical history, and problem list.    Past Medical Hx:  Past Medical History:   Diagnosis Date    Allergic     Erythromycin    Hypertension 10/2018    Pneumonia 2020    Covid       Past Surgical Hx:  Past Surgical History:   Procedure Laterality Date    ADENOIDECTOMY  1968    BREAST BIOPSY      EYE SURGERY  2014    PTK for recurrent corneal erosion    HYSTERECTOMY  2003    TONSILLECTOMY  1968       Current Meds:    Current Outpatient Medications:     chlorthalidone (HYGROTON) 25 MG tablet, Take 1 tablet by mouth Daily., Disp: 90 tablet, Rfl: 1    losartan (Cozaar)  "50 MG tablet, Take 1 tablet by mouth 2 (Two) Times a Day., Disp: 180 tablet, Rfl: 6    metoprolol succinate XL (Toprol XL) 100 MG 24 hr tablet, Take 1 tablet by mouth twice daily, Disp: 180 tablet, Rfl: 6    Allergies:  Allergies   Allergen Reactions    Erythromycin Hives, Diarrhea and Nausea And Vomiting       Family Hx:  Family History   Problem Relation Age of Onset    Thyroid disease Mother     Cancer Father          within 6 weeks of diagnosis        Social History:  Social History     Socioeconomic History    Marital status:    Tobacco Use    Smoking status: Former     Current packs/day: 0.00     Average packs/day: 1 pack/day for 16.7 years (16.7 ttl pk-yrs)     Types: Cigarettes     Start date: 1985     Quit date: 2002     Years since quittin.8    Smokeless tobacco: Never   Vaping Use    Vaping status: Never Used   Substance and Sexual Activity    Alcohol use: Yes     Alcohol/week: 5.0 standard drinks of alcohol     Types: 5 Glasses of wine per week    Drug use: Never    Sexual activity: Yes     Partners: Male       Review of Systems  Review of Systems   Respiratory:  Negative for shortness of breath.    Cardiovascular:  Negative for chest pain and palpitations.   Neurological:  Positive for dizziness. Negative for headaches.       Objective:     /92 (BP Location: Left arm, Patient Position: Sitting, Cuff Size: Adult)   Pulse 75   Ht 165.1 cm (65\")   Wt 91.4 kg (201 lb 9.6 oz)   SpO2 100%   BMI 33.55 kg/m²   Physical Exam  Constitutional:       General: She is not in acute distress.     Appearance: Normal appearance. She is obese. She is not ill-appearing, toxic-appearing or diaphoretic.   Pulmonary:      Effort: Pulmonary effort is normal. No respiratory distress.   Neurological:      Mental Status: She is alert.   Psychiatric:         Mood and Affect: Mood normal.         Behavior: Behavior normal.          Assessment/Plan:     Diagnoses and all orders for this " visit:    1. Hypertension, unspecified type (Primary)      At our 8/27/2024 visit to establish care, we discussed blood pressure which was uncontrolled at 146/70 on manual recheck for us.  At that time she was on losartan 50 mg twice daily and metoprolol succinate 100 mg twice daily.  I had initiated treat with chlorthalidone 25 mg.  We also started workup for secondary causes of hypertension.  Imaging was suggestive of renal artery stenosis and on that basis referred her to nephrology.    I have reviewed nephrology note from her 11/22/2024 appointment.  As she experienced some orthostatic signs on the 50 mg losartan twice daily she was decreased to the 50 mg daily dose.  She was also encouraged to follow a low-sodium diet.  It was thought that the mild to moderate right renal artery stenosis and left renal artery stenosis was not likely to contribute to uncontrolled hypertension at that time.  It appears she has follow-up with nephrology in January of next year and I would encourage her to continue to follow-up.  We are very grateful to nephrology for seeing her.    Today she presents for 3-month follow-up for hypertension. Blood pressure for us today is initially elevated at 147/80.  However, she tells me that it is in the 110s to 130s systolic at home.  We recheck blood pressure manually and it was Improved at 138/92.  Although diastolic is a little higher than where I would like it, based on her values at home, I think for the time being we will de-escalate her appointments to every 6 months.    She did initially have some dizziness after starting chlorthalidone.  Looking at nephrology's note it does suggest this could be orthostatic in nature.  They decreased losartan and I told her to let me know if she continues to have dizziness or orthostatic signs after her body has adjusted to this new dose.  She says currently the dizziness is only intermittent and does not significantly impact her quality of  life.    Would see back at six months intervals for now in terms of this.           2. Hyperglycemia  3. Obesity (BMI 30-39.9)  4.5. MARIAJOSE    Point-of-care hemoglobin A1c obtained due to hyperglycemia on last CMP.  Today, it demonstrates a score of 6, consistent with prediabetes.  We discussed lifestyle/dietary modifications.  We will likely be starting GLP-1 and we will be discussing this in upcoming visits.      She had also been referred to sleep medicine for concerning symptoms suggestive of sleep apnea.  It appears she has been thought to be a good candidate for home sleep study.  Today, she tells me she was diagnosed with MARIAJOSE. She cannot tolerate the CPAP.  We may work to achieve weight loss with GLP-1 pending results of liver MRI.  -     POC Glycosylated Hemoglobin (Hb A1C)        4. Abnormal liver ultrasound      In terms of liver ultrasound did show hepatic steatosis but also cysts of the liver.  We have ordered MRI to further evaluate.  Based on results, if benign cysts would consider starting GLP-1 to help treat obesity with BMI of 33 and comorbid MARIAJOSE , resistant hypertension, hepatic steatosis and prediabetes.           Follow-up:     Return in about 2 months (around 2/3/2025) for Discuss Weight Loss .    Preventative:  Health Maintenance   Topic Date Due    ANNUAL PHYSICAL  01/03/2023    BMI FOLLOWUP  01/03/2023    TDAP/TD VACCINES (1 - Tdap) 12/03/2025 (Originally 7/9/1982)    LIPID PANEL  09/16/2025    MAMMOGRAM  11/19/2026    COLORECTAL CANCER SCREENING  09/15/2027    HEPATITIS C SCREENING  Completed    COVID-19 Vaccine  Completed    INFLUENZA VACCINE  Completed    ZOSTER VACCINE  Completed    Pneumococcal Vaccine 0-64  Aged Out    PAP SMEAR  Discontinued         This document has been electronically signed by Alfonso Li MD on December 3, 2024 13:30 EST       Parts of this note are electronic transcriptions/translations of spoken language to printed text using the Dragon Dictation system.

## 2024-12-17 ENCOUNTER — HOSPITAL ENCOUNTER (OUTPATIENT)
Dept: MRI IMAGING | Facility: HOSPITAL | Age: 59
Discharge: HOME | End: 2024-12-17
Admitting: STUDENT IN AN ORGANIZED HEALTH CARE EDUCATION/TRAINING PROGRAM
Payer: COMMERCIAL

## 2024-12-17 DIAGNOSIS — K76.89 LIVER CYST: ICD-10-CM

## 2024-12-17 LAB
CREAT BLDA-MCNC: 0.8 MG/DL (ref 0.6–1.3)
EGFRCR SERPLBLD CKD-EPI 2021: 83.9 ML/MIN/1.73

## 2024-12-17 PROCEDURE — A9577 INJ MULTIHANCE: HCPCS | Performed by: STUDENT IN AN ORGANIZED HEALTH CARE EDUCATION/TRAINING PROGRAM

## 2024-12-17 PROCEDURE — 74183 MRI ABD W/O CNTR FLWD CNTR: CPT

## 2024-12-17 PROCEDURE — 25510000002 GADOBENATE DIMEGLUMINE 529 MG/ML SOLUTION: Performed by: STUDENT IN AN ORGANIZED HEALTH CARE EDUCATION/TRAINING PROGRAM

## 2024-12-17 PROCEDURE — 82565 ASSAY OF CREATININE: CPT

## 2024-12-17 RX ADMIN — GADOBENATE DIMEGLUMINE 18 ML: 529 INJECTION, SOLUTION INTRAVENOUS at 07:12

## 2024-12-18 ENCOUNTER — TELEPHONE (OUTPATIENT)
Dept: FAMILY MEDICINE CLINIC | Facility: CLINIC | Age: 59
End: 2024-12-18
Payer: COMMERCIAL

## 2024-12-18 DIAGNOSIS — R16.0 HEPATOMEGALY: ICD-10-CM

## 2024-12-18 DIAGNOSIS — K76.89 LIVER CYST: Primary | ICD-10-CM

## 2024-12-18 DIAGNOSIS — K76.0 HEPATIC STEATOSIS: ICD-10-CM

## 2024-12-18 DIAGNOSIS — R16.1 SPLENOMEGALY: ICD-10-CM

## 2024-12-18 NOTE — TELEPHONE ENCOUNTER
Caller: Yokasta Benitez    Relationship: Self    Best call back number: [phone]    What was the call regarding: PATIENT IS RETURNING CALL ABOUT RESULTS.

## 2025-01-21 ENCOUNTER — TRANSCRIBE ORDERS (OUTPATIENT)
Dept: LAB | Facility: HOSPITAL | Age: 60
End: 2025-01-21
Payer: COMMERCIAL

## 2025-01-21 ENCOUNTER — LAB (OUTPATIENT)
Dept: LAB | Facility: HOSPITAL | Age: 60
End: 2025-01-21
Payer: COMMERCIAL

## 2025-01-21 DIAGNOSIS — I10 ESSENTIAL HYPERTENSION, MALIGNANT: ICD-10-CM

## 2025-01-21 DIAGNOSIS — I10 ESSENTIAL HYPERTENSION, MALIGNANT: Primary | ICD-10-CM

## 2025-01-21 LAB
25(OH)D3 SERPL-MCNC: 31.5 NG/ML (ref 30–100)
ALBUMIN SERPL-MCNC: 4.2 G/DL (ref 3.5–5.2)
ANION GAP SERPL CALCULATED.3IONS-SCNC: 8 MMOL/L (ref 5–15)
BACTERIA UR QL AUTO: NORMAL /HPF
BASOPHILS # BLD AUTO: 0.03 10*3/MM3 (ref 0–0.2)
BASOPHILS NFR BLD AUTO: 0.4 % (ref 0–1.5)
BILIRUB UR QL STRIP: NEGATIVE
BUN SERPL-MCNC: 11 MG/DL (ref 8–23)
BUN/CREAT SERPL: 16.2 (ref 7–25)
CALCIUM SPEC-SCNC: 9.2 MG/DL (ref 8.6–10.5)
CHLORIDE SERPL-SCNC: 102 MMOL/L (ref 98–107)
CLARITY UR: CLEAR
CO2 SERPL-SCNC: 29 MMOL/L (ref 22–29)
COLOR UR: YELLOW
CREAT SERPL-MCNC: 0.68 MG/DL (ref 0.57–1)
CREAT UR-MCNC: 16 MG/DL
DEPRECATED RDW RBC AUTO: 39.3 FL (ref 37–54)
EGFRCR SERPLBLD CKD-EPI 2021: 99.2 ML/MIN/1.73
EOSINOPHIL # BLD AUTO: 0.39 10*3/MM3 (ref 0–0.4)
EOSINOPHIL NFR BLD AUTO: 5.7 % (ref 0.3–6.2)
ERYTHROCYTE [DISTWIDTH] IN BLOOD BY AUTOMATED COUNT: 12.6 % (ref 12.3–15.4)
GLUCOSE SERPL-MCNC: 88 MG/DL (ref 65–99)
GLUCOSE UR STRIP-MCNC: NEGATIVE MG/DL
HCT VFR BLD AUTO: 39.7 % (ref 34–46.6)
HGB BLD-MCNC: 13.7 G/DL (ref 12–15.9)
HGB UR QL STRIP.AUTO: NEGATIVE
HYALINE CASTS UR QL AUTO: NORMAL /LPF
IMM GRANULOCYTES # BLD AUTO: 0.01 10*3/MM3 (ref 0–0.05)
IMM GRANULOCYTES NFR BLD AUTO: 0.1 % (ref 0–0.5)
KETONES UR QL STRIP: NEGATIVE
LEUKOCYTE ESTERASE UR QL STRIP.AUTO: ABNORMAL
LYMPHOCYTES # BLD AUTO: 2.28 10*3/MM3 (ref 0.7–3.1)
LYMPHOCYTES NFR BLD AUTO: 33.5 % (ref 19.6–45.3)
MCH RBC QN AUTO: 29.8 PG (ref 26.6–33)
MCHC RBC AUTO-ENTMCNC: 34.5 G/DL (ref 31.5–35.7)
MCV RBC AUTO: 86.3 FL (ref 79–97)
MONOCYTES # BLD AUTO: 0.33 10*3/MM3 (ref 0.1–0.9)
MONOCYTES NFR BLD AUTO: 4.9 % (ref 5–12)
NEUTROPHILS NFR BLD AUTO: 3.76 10*3/MM3 (ref 1.7–7)
NEUTROPHILS NFR BLD AUTO: 55.4 % (ref 42.7–76)
NITRITE UR QL STRIP: NEGATIVE
NRBC BLD AUTO-RTO: 0 /100 WBC (ref 0–0.2)
PH UR STRIP.AUTO: 7.5 [PH] (ref 5–8)
PHOSPHATE SERPL-MCNC: 3.7 MG/DL (ref 2.5–4.5)
PLATELET # BLD AUTO: 241 10*3/MM3 (ref 140–450)
PMV BLD AUTO: 10.4 FL (ref 6–12)
POTASSIUM SERPL-SCNC: 3.7 MMOL/L (ref 3.5–5.2)
PROT ?TM UR-MCNC: <4 MG/DL
PROT UR QL STRIP: NEGATIVE
PROT/CREAT UR: NORMAL MG/G{CREAT}
PTH-INTACT SERPL-MCNC: 59.9 PG/ML (ref 15–65)
RBC # BLD AUTO: 4.6 10*6/MM3 (ref 3.77–5.28)
RBC # UR STRIP: NORMAL /HPF
REF LAB TEST METHOD: NORMAL
SODIUM SERPL-SCNC: 139 MMOL/L (ref 136–145)
SODIUM UR-SCNC: <20 MMOL/L
SP GR UR STRIP: <=1.005 (ref 1–1.03)
SQUAMOUS #/AREA URNS HPF: NORMAL /HPF
UROBILINOGEN UR QL STRIP: ABNORMAL
WBC # UR STRIP: NORMAL /HPF
WBC NRBC COR # BLD AUTO: 6.8 10*3/MM3 (ref 3.4–10.8)

## 2025-01-21 PROCEDURE — 82570 ASSAY OF URINE CREATININE: CPT

## 2025-01-21 PROCEDURE — 85025 COMPLETE CBC W/AUTO DIFF WBC: CPT | Performed by: NURSE PRACTITIONER

## 2025-01-21 PROCEDURE — 80069 RENAL FUNCTION PANEL: CPT | Performed by: NURSE PRACTITIONER

## 2025-01-21 PROCEDURE — 81001 URINALYSIS AUTO W/SCOPE: CPT

## 2025-01-21 PROCEDURE — 82306 VITAMIN D 25 HYDROXY: CPT

## 2025-01-21 PROCEDURE — 83970 ASSAY OF PARATHORMONE: CPT

## 2025-01-21 PROCEDURE — 84156 ASSAY OF PROTEIN URINE: CPT

## 2025-01-21 PROCEDURE — 36415 COLL VENOUS BLD VENIPUNCTURE: CPT

## 2025-01-21 PROCEDURE — 84300 ASSAY OF URINE SODIUM: CPT

## 2025-02-06 ENCOUNTER — TELEPHONE (OUTPATIENT)
Dept: FAMILY MEDICINE CLINIC | Facility: CLINIC | Age: 60
End: 2025-02-06

## 2025-02-06 DIAGNOSIS — I10 PRIMARY HYPERTENSION: ICD-10-CM

## 2025-02-06 RX ORDER — CHLORTHALIDONE 25 MG/1
25 TABLET ORAL DAILY
Qty: 90 TABLET | Refills: 1 | Status: SHIPPED | OUTPATIENT
Start: 2025-02-06

## 2025-02-06 NOTE — TELEPHONE ENCOUNTER
Medication has been called in - and I am back in the office if she would still like to be seen today.     Thank you,    Alfonso Li      This document has been electronically signed by Alfonso Li MD on February 6, 2025 09:35 EST

## 2025-02-06 NOTE — TELEPHONE ENCOUNTER
Patient rescheduled for 02/06/2025 at 8:15 AM due to PCP being out of office. Patient is now scheduled for 03/06/2025 at 9:45 AM. Patient states needs a refill for chlorthalidone (HYGROTON) 25 MG tablet due to almost being out and will be out before next appt.     Pharm is   [place] [address]  Phone: [phone]  Fax: [phone]

## 2025-03-06 ENCOUNTER — OFFICE VISIT (OUTPATIENT)
Dept: FAMILY MEDICINE CLINIC | Facility: CLINIC | Age: 60
End: 2025-03-06
Payer: COMMERCIAL

## 2025-03-06 VITALS
HEART RATE: 68 BPM | OXYGEN SATURATION: 99 % | WEIGHT: 190.4 LBS | BODY MASS INDEX: 31.72 KG/M2 | HEIGHT: 65 IN | SYSTOLIC BLOOD PRESSURE: 125 MMHG | DIASTOLIC BLOOD PRESSURE: 62 MMHG

## 2025-03-06 DIAGNOSIS — K76.0 HEPATIC STEATOSIS: Primary | ICD-10-CM

## 2025-03-06 PROCEDURE — 99213 OFFICE O/P EST LOW 20 MIN: CPT | Performed by: STUDENT IN AN ORGANIZED HEALTH CARE EDUCATION/TRAINING PROGRAM

## 2025-03-06 NOTE — PROGRESS NOTES
Subjective:       Yokasta Benitez is a 59 y.o. female with a concurrent medical history of essential hypertension, obesity, MARIAJOSE, hepatic steatosis, renal artery stenosis, and recurrent corneal erosions who presents to discuss hepatic steatosis and imaging results.     Yokasta has a history of obesity and weight related comorbid conditions including MARIAJOSE, HTN, and hepatic steatosis. Hepatic steatosis was seen on liver ultrasound in October of 2024. This also showed cystic lesions in her liver with consideration suggested by radiologist for MRI.     At the time, I was considering using a GLP-1 to help with weight loss, but wanted to obtain the MRI first to further assess. This resulted and showed these lesions are benign. Hepatic steatosis was noted to be diffuse, and she does have hepatomegaly.     Normally, at this point we would discuss weight loss medications and referral to GI. However, Yokasta has already lost nearly 20 pounds since our establish care visit on her own. She weighed 211 pounds in August 2024 and is down to 190 today. Because of her improvement, and as the cysts seen on ultrasound are benign, I recommend continued weight loss with repeat liver ultrasound + CMP in one year. I also gave her the option of referral to GI today. Yokasta expressed preference to attempt continued weight loss first, before seeing GI. I think this is reasonable.       Past Medical Hx:  Past Medical History:   Diagnosis Date    Allergic     Erythromycin    Hypertension 10/2018    Pneumonia 2020    Covid       Past Surgical Hx:  Past Surgical History:   Procedure Laterality Date    ADENOIDECTOMY  1968    BREAST BIOPSY      EYE SURGERY  2014    PTK for recurrent corneal erosion    HYSTERECTOMY  2003    TONSILLECTOMY  1968       Current Meds:    Current Outpatient Medications:     chlorthalidone (HYGROTON) 25 MG tablet, Take 1 tablet by mouth Daily., Disp: 90 tablet, Rfl: 1    losartan (Cozaar) 50 MG tablet, Take 1 tablet by mouth 2  "(Two) Times a Day. (Patient taking differently: Take 1 tablet by mouth Daily.), Disp: 180 tablet, Rfl: 6    metoprolol succinate XL (Toprol XL) 100 MG 24 hr tablet, Take 1 tablet by mouth twice daily (Patient taking differently: Take 1 tablet by mouth Daily.), Disp: 180 tablet, Rfl: 6    Allergies:  Allergies   Allergen Reactions    Erythromycin Hives, Diarrhea and Nausea And Vomiting       Family Hx:  Family History   Problem Relation Age of Onset    Thyroid disease Mother     Cancer Father          within 6 weeks of diagnosis        Social History:  Social History     Socioeconomic History    Marital status:    Tobacco Use    Smoking status: Former     Current packs/day: 0.00     Average packs/day: 1 pack/day for 16.7 years (16.7 ttl pk-yrs)     Types: Cigarettes     Start date: 1985     Quit date: 2002     Years since quittin.0    Smokeless tobacco: Never   Vaping Use    Vaping status: Never Used   Substance and Sexual Activity    Alcohol use: Yes     Alcohol/week: 5.0 standard drinks of alcohol     Types: 5 Glasses of wine per week    Drug use: Never    Sexual activity: Yes     Partners: Male       Review of Systems  Review of Systems   Respiratory:  Positive for shortness of breath.    Cardiovascular:  Positive for palpitations. Negative for chest pain.   Neurological:  Positive for headaches.       Objective:     /62 (BP Location: Left arm, Patient Position: Sitting, Cuff Size: Large Adult)   Pulse 68   Ht 165.1 cm (65\")   Wt 86.4 kg (190 lb 6.4 oz)   SpO2 99%   BMI 31.68 kg/m²   Physical Exam  Constitutional:       General: She is not in acute distress.     Appearance: Normal appearance. She is obese. She is not ill-appearing, toxic-appearing or diaphoretic.   Pulmonary:      Effort: Pulmonary effort is normal. No respiratory distress.   Neurological:      Mental Status: She is alert.   Psychiatric:         Mood and Affect: Mood normal.         Behavior: Behavior normal. "          Assessment/Plan:     Diagnoses and all orders for this visit:    1. Hepatic steatosis (Primary)    Yokasta has a history of obesity and weight related comorbid conditions including MARIAJOSE, HTN, and hepatic steatosis. Hepatic steatosis was seen on liver ultrasound in October of 2024. This also showed cystic lesions in her liver with consideration suggested by radiologist for MRI.     At the time, I was considering using a GLP-1 to help with weight loss, but wanted to obtain the MRI first to further assess. This resulted and showed these lesions are benign. Hepatic steatosis was noted to be diffuse, and she does have hepatomegaly.     Normally, at this point we would discuss weight loss medications and referral to GI. However, Yokasta has already lost nearly 20 pounds since our establish care visit on her own. She weighed 211 pounds in August 2024 and is down to 190 today. Because of her improvement, and as the cysts seen on ultrasound are benign, I recommend continued weight loss with repeat liver ultrasound + CMP in one year. I also gave her the option of referral to GI today. Yokasta expressed preference to attempt continued weight loss first, before seeing GI. I think this is reasonable. Have ordered the repeat U/S for one year.       -     US Liver; Future             Follow-up:     Return in about 6 months (around 9/6/2025) for Annual physical.    Preventative:  Health Maintenance   Topic Date Due    Pneumococcal Vaccine 50+ (1 of 2 - PCV) Never done    ANNUAL PHYSICAL  01/03/2023    BMI FOLLOWUP  01/03/2023    TDAP/TD VACCINES (1 - Tdap) 12/03/2025 (Originally 7/9/1982)    LIPID PANEL  09/16/2025    MAMMOGRAM  11/19/2026    COLORECTAL CANCER SCREENING  09/15/2027    HEPATITIS C SCREENING  Completed    COVID-19 Vaccine  Completed    INFLUENZA VACCINE  Completed    ZOSTER VACCINE  Completed    PAP SMEAR  Discontinued         This document has been electronically signed by Alfonso Li MD on March 6, 2025 12:31  EST       Parts of this note are electronic transcriptions/translations of spoken language to printed text using the Dragon Dictation system.

## 2025-05-13 ENCOUNTER — HOSPITAL ENCOUNTER (OUTPATIENT)
Dept: GENERAL RADIOLOGY | Facility: HOSPITAL | Age: 60
Discharge: HOME | End: 2025-05-13
Payer: COMMERCIAL

## 2025-05-13 DIAGNOSIS — Z87.828 HISTORY OF TRAUMA: Primary | ICD-10-CM

## 2025-05-13 DIAGNOSIS — Z87.828 HISTORY OF TRAUMA: ICD-10-CM

## 2025-05-13 PROCEDURE — 73000 X-RAY EXAM OF COLLAR BONE: CPT

## 2025-05-13 PROCEDURE — 73030 X-RAY EXAM OF SHOULDER: CPT

## 2025-06-26 ENCOUNTER — PREP FOR SURGERY (OUTPATIENT)
Dept: OTHER | Facility: HOSPITAL | Age: 60
End: 2025-06-26
Payer: COMMERCIAL

## 2025-06-26 ENCOUNTER — OFFICE VISIT (OUTPATIENT)
Dept: ORTHOPEDIC SURGERY | Facility: CLINIC | Age: 60
End: 2025-06-26
Payer: COMMERCIAL

## 2025-06-26 VITALS — WEIGHT: 190 LBS | BODY MASS INDEX: 31.65 KG/M2 | HEIGHT: 65 IN

## 2025-06-26 DIAGNOSIS — M18.12 PRIMARY OSTEOARTHRITIS OF FIRST CARPOMETACARPAL JOINT OF LEFT HAND: ICD-10-CM

## 2025-06-26 DIAGNOSIS — G56.02 CARPAL TUNNEL SYNDROME ON LEFT: ICD-10-CM

## 2025-06-26 DIAGNOSIS — G56.02 CARPAL TUNNEL SYNDROME ON LEFT: Primary | ICD-10-CM

## 2025-06-26 DIAGNOSIS — M79.642 LEFT HAND PAIN: Primary | ICD-10-CM

## 2025-06-26 NOTE — PROGRESS NOTES
"Chief Complaint  Pain and Initial Evaluation of the Left Hand       Subjective      Yokasta Benitez presents to [facility] for an evaluation  of her left hand. She was diagnosed with carpal tunnel several years back with Dr. Yadav but states she was treating this conservatively. She had prior right carpal tunnel release with Dr. Yadav in the past. She has numbness and tingling to her left hand and states this is constant. She has pain to her left thumb. She has been wearing braces and taking over the counter medications without much relief.     Allergies   Allergen Reactions    Erythromycin Hives, Diarrhea and Nausea And Vomiting        Social History     Socioeconomic History    Marital status:    Tobacco Use    Smoking status: Former     Current packs/day: 0.00     Average packs/day: 1 pack/day for 16.7 years (16.7 ttl pk-yrs)     Types: Cigarettes     Start date: 1985     Quit date: 2002     Years since quittin.3    Smokeless tobacco: Never   Vaping Use    Vaping status: Never Used   Substance and Sexual Activity    Alcohol use: Yes     Alcohol/week: 5.0 standard drinks of alcohol     Types: 5 Glasses of wine per week    Drug use: Never    Sexual activity: Yes     Partners: Male        I reviewed the patient's chief complaint, history of present illness, review of systems, past medical history, surgical history, family history, social history, medications, and allergy list.     Review of Systems     Constitutional: Denies fevers, chills, weight loss  Cardiovascular: Denies chest pain, shortness of breath  Skin: Denies rashes, acute skin changes  Neurologic: Denies headache, loss of consciousness  MSK: left hand pain       Vital Signs:   Ht 165.1 cm (65\")   Wt 86.2 kg (190 lb)   BMI 31.62 kg/m²            Ortho Exam    Physical Exam  General:Alert. No acute distress   Left upper extremity: tender to the left thumb CMC, positive  grind test, mild swelling, positive  " Tinel's  and compression at the wrist, neurovascularly intact, full finger and wrist range of motion, sensation intact to the medial, radial and ulnar nerve, positive  pulses     Procedures    X-Ray Report:  Left hand  X-Ray  Indication: Evaluation of left hand pain   AP/Lateral view(s)  Findings: advanced degenerative changes to the left thumb, no acute fracture     Prior studies available for comparison: no       Imaging Results (Most Recent)       Procedure Component Value Units Date/Time    XR Hand 2 View Left - In process [[id number]] Resulted: 06/26/25 0756     Updated: 06/26/25 0758    This result has not been signed. Information might be incomplete.               Result Review :       No results found.           Assessment and Plan     Diagnoses and all orders for this visit:    1. Left hand pain (Primary)  -     XR Hand 2 View Left    2. Primary osteoarthritis of first carpometacarpal joint of left hand    3. Carpal tunnel syndrome on left      The patient presents here today for an evaluation  of her left hand. X-rays were obtained in the office today and these were reviewed today.     Discussed operative treatment options regarding a left carpal tunnel release with a left thumb CMC steroid injection versus a left thumb CMC arthroplasty versus non operative treatment options regarding injections, medications and therapy.     Patient wishes to proceed with operative treatment options regarding a left carpal tunnel release. Risks and benefits was discussed and reviewed with the patient today. Patient expressed understanding and wishes to proceed.     She will call if and when she would like a left thumb steroid injection in the future.      Discussed surgery., Risks/benefits discussed with patient including, but not limited to: infection, bleeding, neurovascular damage, re-rupture, aesthetic deformity, need for further surgery, and death., Surgery pamphlet given., and Call or return if worsening  symptoms.    Follow Up     2 weeks post-operatively      Patient was given instructions and counseling regarding her condition or for health maintenance advice. Please see specific information pulled into the AVS if appropriate.     Scribed for Gallo Chavarria MD by Haylie Rubio.  06/26/25   08:26 EDT    I have personally performed the services described in this document as scribed by the above individual and it is both accurate and complete. Gallo Chavarria MD 06/26/25

## 2025-07-21 ENCOUNTER — TELEPHONE (OUTPATIENT)
Dept: ORTHOPEDIC SURGERY | Facility: CLINIC | Age: 60
End: 2025-07-21
Payer: COMMERCIAL

## 2025-07-21 NOTE — TELEPHONE ENCOUNTER
Caller: Yokasta Benitez    Relationship to patient: Self    Best call back number:     Chief complaint: CARPAL TUNNEL RELEASE    Type of visit: SXS    Requested date: 9/25/25     If rescheduling, when is the original appointment: 7/28/25     Additional notes:PLEASE CALL TO RESCHEDULE

## 2025-07-21 NOTE — TELEPHONE ENCOUNTER
SPOKE WITH PATIENT, SLOAN RS'D TO 9/22/2025 PER PATIENT'S REQUEST DUE TO CONFLICT W/EMPLOYER SCHEDULING. PATIENT IS AWARE THE HOSPITAL WILL CALL THE FRIDAY PRIOR W/ARRIVAL TIME. PATIENT VERBALIZED UNDERSTANDING.

## 2025-08-13 DIAGNOSIS — I10 PRIMARY HYPERTENSION: ICD-10-CM

## 2025-08-13 RX ORDER — CHLORTHALIDONE 25 MG/1
25 TABLET ORAL DAILY
Qty: 30 TABLET | Refills: 0 | Status: SHIPPED | OUTPATIENT
Start: 2025-08-13